# Patient Record
Sex: MALE | Race: WHITE | NOT HISPANIC OR LATINO | Employment: UNEMPLOYED | URBAN - METROPOLITAN AREA
[De-identification: names, ages, dates, MRNs, and addresses within clinical notes are randomized per-mention and may not be internally consistent; named-entity substitution may affect disease eponyms.]

---

## 2019-07-31 PROBLEM — M54.2 NECK PAIN: Status: ACTIVE | Noted: 2019-07-31

## 2019-07-31 PROBLEM — M79.601 BILATERAL ARM PAIN: Status: ACTIVE | Noted: 2019-07-31

## 2019-07-31 PROBLEM — M79.602 BILATERAL ARM PAIN: Status: ACTIVE | Noted: 2019-07-31

## 2020-09-14 ENCOUNTER — HISTORICAL (OUTPATIENT)
Dept: RADIOLOGY | Facility: HOSPITAL | Age: 40
End: 2020-09-14

## 2022-04-11 ENCOUNTER — HISTORICAL (OUTPATIENT)
Dept: ADMINISTRATIVE | Facility: HOSPITAL | Age: 42
End: 2022-04-11
Payer: MEDICAID

## 2022-04-29 VITALS
SYSTOLIC BLOOD PRESSURE: 133 MMHG | DIASTOLIC BLOOD PRESSURE: 89 MMHG | BODY MASS INDEX: 34.57 KG/M2 | WEIGHT: 246.94 LBS | HEIGHT: 71 IN

## 2024-05-23 ENCOUNTER — HOSPITAL ENCOUNTER (OUTPATIENT)
Dept: RADIOLOGY | Facility: HOSPITAL | Age: 44
Discharge: HOME | End: 2024-05-23
Attending: UROLOGY
Payer: MEDICAID

## 2024-05-23 DIAGNOSIS — N20.0 KIDNEY STONE: ICD-10-CM

## 2024-05-23 PROCEDURE — 74018 RADEX ABDOMEN 1 VIEW: CPT | Mod: TC

## 2024-05-23 PROCEDURE — 76770 US EXAM ABDO BACK WALL COMP: CPT | Mod: TC

## 2024-07-18 DIAGNOSIS — E11.65 UNCONTROLLED TYPE 2 DIABETES MELLITUS WITH HYPERGLYCEMIA: Primary | ICD-10-CM

## 2025-01-27 DIAGNOSIS — M79.642 PAIN IN LEFT HAND: Primary | ICD-10-CM

## 2025-01-28 ENCOUNTER — CLINICAL SUPPORT (OUTPATIENT)
Dept: REHABILITATION | Facility: HOSPITAL | Age: 45
End: 2025-01-28
Payer: MEDICAID

## 2025-01-28 DIAGNOSIS — M79.642 LEFT HAND PAIN: Primary | ICD-10-CM

## 2025-01-28 PROCEDURE — 97162 PT EVAL MOD COMPLEX 30 MIN: CPT

## 2025-01-28 PROCEDURE — 97530 THERAPEUTIC ACTIVITIES: CPT

## 2025-01-28 NOTE — PROGRESS NOTES
Outpatient Rehab    Occupational Therapy Evaluation    Patient Name: Luke Crowder Jr.  MRN: [id number]  YOB: 1980  Today's Date: 1/29/2025    Therapy Diagnosis:   Encounter Diagnosis   Name Primary?    Left hand pain Yes     Physician: Dean Kelley MD    Physician Orders: Eval and Treat  Medical Diagnosis: Left hand weakness.    Visit # / Visits Authorized:  16 / TBD   Date of Evaluation:  1/28/2025   Insurance Authorization Period: TBD   Plan of Care Certification:  1/28/2025 to 04/22/25      Time In: 1035   Time Out: 1135  Total Time: 60   Total Billable Time: 60    Precautions  Left Upper Extremity Weight-Bearing Status: Full weight-bearing         Subjective   History of Present Illness  Luke is a 44 y.o. male who reports to occupational therapy with a chief concern of Left hand pain, weakness, and decreased AROM.. According to the patient's chart, Luke has a past medical history of Hypertension and Neuropathy. Luke has a past surgical history that includes Cholecystectomy and Spine surgery.    The patient reports a medical diagnosis of Left hand injury. The patient has experienced this issue since 12/06/24.   Diagnostic tests related to this condition: X-ray.   X-Ray Details: Negative    Dominant Hand: Right  History of Present Condition/Illness: Luke is 43 y/o male with recent history of left hand injury.  Luke reported that on 12/06/24 he was cutting up vegetables using a [place] gardner hydraulic cutter when he accidentally press the engage button with his left hand near the cutting blades resulting in severe lacerations of the distal ends of digits 2-5.  Luke was taken to the ED department at [facility] were he was assessed, lacerations sutured, and he was instructed to return in 2 weeks to have sutures removed.  Luke returned to the hospital and his sutures were removed and he was instructed to follow-up with his PCP.  Luke was seen by his PCP Dr. Moran  Allie on 01/14/25 and orders sent to [facility] out-patient OT to evaluate and treat left hand pain/weakness.    Activities of Daily Living  Social history was obtained from Patient.    General Prior Level of Function Comments: Independent  General Current Level of Function Comments: Modified Independent  Patient Responsibilities: Driving, Home management, Personal ADL    Previously independent with activities of daily living? Yes     Currently independent with activities of daily living? Yes          Previously independent with instrumental activities of daily living? Yes     Currently independent with instrumental activities of daily living? No  Activities currently needing assistance include: Meal prep.            Pain     Patient reports a current pain level of 2/10. Pain at best is reported as 0/10. Pain at worst is reported as 10/10.   Location: Left hand primarily middle and ring finger.  Clinical Progression (since onset): Stable  Pain Qualities: Knife-like, Needle-like, Sharp  Pain-Relieving Factors: Immobilization, Relaxation  Pain-Aggravating Factors: Holding objects, Lifting, Other (Comment)  Other Pain-Aggravating Factors: Digit flexion         Review of Systems  Patient reports: Diabetes, Anxiety, Arthritis, Back Pain, Depression, and Neck Pain        Living Arrangements  Living Situation  Housing: Home independently  Living Arrangements: Alone  Support Systems: Parent    Equipment/Treatments  Patient Expressive Communication Modes: Verbal        Employment  Employment Status: On disability          Past Medical History/Physical Systems Review:   Luke Lakesha Benavides  has a past medical history of Hypertension and Neuropathy.    Luke Lakesha Benavides  has a past surgical history that includes Cholecystectomy and Spine surgery.    Luke has a current medication list which includes the following prescription(s): aspirin, escitalopram oxalate, meloxicam, metoprolol succinate, promethazine, and  tramadol.    Review of patient's allergies indicates:  No Known Allergies     Objective   Communication  The patient's current expressive communication modes are Verbal.                         The patient's behavior and affect appear Appropriate to situation and Pleasant.                      Cognition   Orientation level is Oriented x4.      Observed memory impairments include Decreased short-term memory.   Observed attention impairments include None/intact.                                                 Upper Extremity Sensation  General Upper Extremity Sensation  Impaired: Left       Left Upper Extremity Sensation  Intact: Sharp/Dull Discrimination, Kinesthesia, and Proprioception  Diminished: Light Touch  Left Upper Extremity Sensation Light Touch Comment: Volar aspect distal 2/3rds of digits 3,4,5             Wrist/Hand Palpation          Left Wrist/Hand Palpation  Abnormal: Muscle  Left Wrist Hand Muscle Palpation Observations: Tender with palpation of scar tissue on volar aspect of digits 3,4,5            Digit 2 - Index Finger Active Range of Motion  Left Index Finger   Flexion (deg) Extension (deg) Pain   MCP 70       PIP 80       DIP 60       ABDULLAHI:      Digit 3 - Middle Finger Active Range of Motion  Left Middle Finger   Flexion (deg) Extension (deg) Pain   MCP 75       PIP 85       DIP 37   Yes   ABDULLAHI:      Digit 4 - Ring Finger Active Range of Motion  Left Ring Finger   Flexion (deg) Extension (deg) Pain   MCP 65       PIP 82       DIP 37   Yes   ABDULLAHI:      Digit 5 - Little Finger Active Range of Motion  Left Little Finger   Flexion (deg) Extension (deg) Pain   MCP 65       PIP 75       DIP 37   Yes   ABDULLAHI:        Tested thumb opposition using Kapandji scale, right hand 10/10, left 10/10                 Right  Strength  Right Hand Dynamometer Position: 2  Elbow Position Forearm Position Trial 1 (lbs) Trial 2  (lbs) Trial 3  (lbs) Average  (lbs) Pain   Flexed Neutral 39 25 25 29.67         Left   "Strength  Left Hand Dynamometer Position: 2  Elbow Position Forearm Position Trial 1 (lbs) Trial 2 (lbs) Trial 3 (lbs) Average (lbs) Pain   Flexed Neutral 13 10 5 9.33         Right Pinch Strength   Trial 1 (lbs) Trial 2 (lbs) Trial 3 (lbs) Average (lbs) Pain   Lateral (Key Pinch) 5           Three Point (Three Jaw Ean) 4           Two Point (Tip to Tip) 4               Left Pinch Strength   Trial 1 (lbs) Trial 2 (lbs) Trial 3 (lbs) Average (lbs) Pain   Lateral (Key Pinch) 3           Three Point (Three Jaw Ean) 5           Two Point (Tip to Tip) 2                      Wrist/Hand Special Tests  Hand Coordination Special Tests  Right 9-Hole Peg Test (sec): 24  Left 9-Hole Peg Test (sec): 25       Coordination  Right 9-Hole Peg Test (sec): 24  Left 9-Hole Peg Test (sec): 25               Intake Outcome Measure for FOTO Survey    Therapist reviewed FOTO scores for Luke Crowder Jr. on 1/28/2025.   FOTO report - see Media section or FOTO account episode details.     Intake Score: 31%    Treatment:  Therapeutic Activity  Therapeutic Activity 1: Educated pt on scar massage and left hand AROM home exercise program.    Patient's spiritual, cultural, and educational needs considered and patient agreeable to plan of care and goals.     Assessment & Plan   Assessment  Luke presents with a condition of Moderate complexity.   Presentation of Symptoms: Stable  Will Comorbidities Impact Care: Yes  History of diabetes with neuropathy will impact sensory recovery.    IADL Limitations: Driving, Meal preparation and cleanup  Rest and Sleep Limitations: Disrupted sleep pattern  Functional Limitations: Activity tolerance, Carrying objects, Fine motor coordination, Pain with ADLs/IADLs, Range of motion                    Patient Goal for Therapy (OT): "I want to be able to bend my fingers."  Prognosis: Good  Assessment Details: Luke is 45 y/o male with recent history of left hand injury.  He sustained multiple lacerations to the " distal 2/3rds of the volar aspect of digits 2-5.  All wounds have completely healed, but dense scar tissue has formed primarily in digits 3,4, and 5.  Luke demonstrates decreased AROM, strength, fine motor coordination and discomfort primarily in the DIP joints of digits 3,4, and 5 effecting left hand function when performing IADL activities.  He will benefit from OT services to address deficits with goal of performing prior IADL activities with minimal difficulty.       Plan  From an occupational therapy perspective, the patient would benefit from: Skilled Rehab Services    Planned therapy interventions include: Therapeutic exercise, Therapeutic activities, and Manual therapy.    Planned modalities to include: Paraffin bath and Thermotherapy (hot pack).        Visit Frequency: 2 times Per Week for 8 Weeks.       This plan was discussed with Patient.   Discussion participants: Agreed Upon Plan of Care             Goals:   Active       Complete Long Term Goals in 8 weeks       Pt will increase left hand AROM and strength to be able to carry a shopping bag with minimal difficulty.       Start:  01/29/25    Expected End:  03/26/25            Pt will increase left hand FMC to perform dressing activity with minimal difficulty.       Start:  01/29/25    Expected End:  03/26/25            Pt will report and increase on the FOTO outcome measure to 52 or better.       Start:  01/29/25    Expected End:  03/26/25               Complete Short Term Goals in 4 weeks       Pt will increase left hand  strength 5# and pinch strength 2 # to be able to carry a shopping bag with moderate difficulty.       Start:  01/29/25    Expected End:  02/26/25            Pt will increase left hand AROM to be able to turn a key with moderate difficulty.       Start:  01/29/25    Expected End:  02/26/25            Pt will be educated on a home exercise program and perform independently.       Start:  01/29/25    Expected End:  02/26/25             Pt will report and increase on the FOTO outcome measure to 40 or better.       Start:  01/29/25    Expected End:  02/26/25

## 2025-01-28 NOTE — LETTER
January 29, 2025  Dean Kelley MD  [address] [place], [place] [address] [place] [address]    Dear Luke Crowder JrJuan Alberto,    The attached plan of care is being sent to you for review and reference.    You may indicate your approval by signing the document electronically, or by faxing/mailing a signed copy of the final page of this document back to the attention of Siddhartha Mercado OT:         Plan of Care 1/28/25   Effective from: 1/28/2025  Effective to: 4/22/2025    Plan ID: [id number]            Participants as of Finalize on 1/29/2025    Name Type Comments Contact Info    Dean Kelley MD PCP - General  [phone]    Siddhartha Mercado OT Occupational Therapist         Last Plan Note     Author: Siddhartha Mercado OT Status: Incomplete Last edited: 1/28/2025 12:00 PM         Outpatient Rehab    Occupational Therapy Evaluation    Patient Name: Luke Crowder Jr.  MRN: [id number]  YOB: 1980  Today's Date: 1/29/2025    Therapy Diagnosis:   Encounter Diagnosis   Name Primary?    Left hand pain Yes     Physician: Dean Kelley MD    Physician Orders: Eval and Treat  Medical Diagnosis: Left hand weakness.    Visit # / Visits Authorized:  16 / TBD   Date of Evaluation:  1/28/2025   Insurance Authorization Period: TBD   Plan of Care Certification:  1/28/2025 to 04/22/25      Time In: 1035   Time Out: 1135  Total Time: 60   Total Billable Time: 60    Precautions  Left Upper Extremity Weight-Bearing Status: Full weight-bearing         Subjective  History of Present Illness  Luke is a 44 y.o. male who reports to occupational therapy with a chief concern of Left hand pain, weakness, and decreased AROM.. According to the patient's chart, Luke has a past medical history of Hypertension and Neuropathy. Luke has a past surgical history that includes Cholecystectomy and Spine surgery.    The patient reports a medical diagnosis of Left hand injury. The patient has experienced  this issue since 12/06/24.   Diagnostic tests related to this condition: X-ray.   X-Ray Details: Negative    Dominant Hand: Right  History of Present Condition/Illness: Luke is 43 y/o male with recent history of left hand injury.  Luke reported that on 12/06/24 he was cutting up vegetables using a [place] gardner hydraulic cutter when he accidentally press the engage button with his left hand near the cutting blades resulting in severe lacerations of the distal ends of digits 2-5.  Luke was taken to the ED department at [facility] were he was assessed, lacerations sutured, and he was instructed to return in 2 weeks to have sutures removed.  Luke returned to the hospital and his sutures were removed and he was instructed to follow-up with his PCP.  Luek was seen by his PCP Dr. Dean Kelley on 01/14/25 and orders sent to [facility] out-patient OT to evaluate and treat left hand pain/weakness.    Activities of Daily Living  Social history was obtained from Patient.    General Prior Level of Function Comments: Independent  General Current Level of Function Comments: Modified Independent  Patient Responsibilities: Driving, Home management, Personal ADL    Previously independent with activities of daily living? Yes     Currently independent with activities of daily living? Yes          Previously independent with instrumental activities of daily living? Yes     Currently independent with instrumental activities of daily living? No  Activities currently needing assistance include: Meal prep.            Pain     Patient reports a current pain level of 2/10. Pain at best is reported as 0/10. Pain at worst is reported as 10/10.   Location: Left hand primarily middle and ring finger.  Clinical Progression (since onset): Stable  Pain Qualities: Knife-like, Needle-like, Sharp  Pain-Relieving Factors: Immobilization, Relaxation  Pain-Aggravating Factors: Holding objects, Lifting, Other (Comment)  Other  Pain-Aggravating Factors: Digit flexion         Review of Systems  Patient reports: Diabetes, Anxiety, Arthritis, Back Pain, Depression, and Neck Pain        Living Arrangements  Living Situation  Housing: Home independently  Living Arrangements: Alone  Support Systems: Parent    Equipment/Treatments  Patient Expressive Communication Modes: Verbal        Employment  Employment Status: On disability          Past Medical History/Physical Systems Review:   Luke Crowder Jr.  has a past medical history of Hypertension and Neuropathy.    Luke Crowder Jr.  has a past surgical history that includes Cholecystectomy and Spine surgery.    Luke has a current medication list which includes the following prescription(s): aspirin, escitalopram oxalate, meloxicam, metoprolol succinate, promethazine, and tramadol.    Review of patient's allergies indicates:  No Known Allergies     Objective  Communication  The patient's current expressive communication modes are Verbal.                         The patient's behavior and affect appear Appropriate to situation and Pleasant.                      Cognition   Orientation level is Oriented x4.      Observed memory impairments include Decreased short-term memory.   Observed attention impairments include None/intact.                                                 Upper Extremity Sensation  General Upper Extremity Sensation  Impaired: Left       Left Upper Extremity Sensation  Intact: Sharp/Dull Discrimination, Kinesthesia, and Proprioception  Diminished: Light Touch  Left Upper Extremity Sensation Light Touch Comment: Volar aspect distal 2/3rds of digits 3,4,5             Wrist/Hand Palpation          Left Wrist/Hand Palpation  Abnormal: Muscle  Left Wrist Hand Muscle Palpation Observations: Tender with palpation of scar tissue on volar aspect of digits 3,4,5            Digit 2 - Index Finger Active Range of Motion  Left Index Finger   Flexion (deg) Extension (deg) Pain   MCP 70        PIP 80       DIP 60       ABDULLAHI:      Digit 3 - Middle Finger Active Range of Motion  Left Middle Finger   Flexion (deg) Extension (deg) Pain   MCP 75       PIP 85       DIP 37   Yes   ABDULLAHI:      Digit 4 - Ring Finger Active Range of Motion  Left Ring Finger   Flexion (deg) Extension (deg) Pain   MCP 65       PIP 82       DIP 37   Yes   ABDULLAHI:      Digit 5 - Little Finger Active Range of Motion  Left Little Finger   Flexion (deg) Extension (deg) Pain   MCP 65       PIP 75       DIP 37   Yes   ABDULLAHI:        Tested thumb opposition using Kapandji scale, right hand 10/10, left 10/10                 Right  Strength  Right Hand Dynamometer Position: 2  Elbow Position Forearm Position Trial 1 (lbs) Trial 2  (lbs) Trial 3  (lbs) Average  (lbs) Pain   Flexed Neutral 39 25 25 29.67         Left  Strength  Left Hand Dynamometer Position: 2  Elbow Position Forearm Position Trial 1 (lbs) Trial 2 (lbs) Trial 3 (lbs) Average (lbs) Pain   Flexed Neutral 13 10 5 9.33         Right Pinch Strength   Trial 1 (lbs) Trial 2 (lbs) Trial 3 (lbs) Average (lbs) Pain   Lateral (Key Pinch) 5           Three Point (Three Jaw Ean) 4           Two Point (Tip to Tip) 4               Left Pinch Strength   Trial 1 (lbs) Trial 2 (lbs) Trial 3 (lbs) Average (lbs) Pain   Lateral (Key Pinch) 3           Three Point (Three Jaw Ean) 5           Two Point (Tip to Tip) 2                      Wrist/Hand Special Tests  Hand Coordination Special Tests  Right 9-Hole Peg Test (sec): 24  Left 9-Hole Peg Test (sec): 25       Coordination  Right 9-Hole Peg Test (sec): 24  Left 9-Hole Peg Test (sec): 25               Intake Outcome Measure for FOTO Survey    Therapist reviewed FOTO scores for Luke Wynngeno Benavides on 1/28/2025.   FOTO report - see Media section or FOTO account episode details.     Intake Score: 31%    Treatment:  Therapeutic Activity  Therapeutic Activity 1: Educated pt on scar massage and left hand AROM home exercise program.    Patient's  "spiritual, cultural, and educational needs considered and patient agreeable to plan of care and goals.     Assessment & Plan  Assessment  Luke presents with a condition of Moderate complexity.   Presentation of Symptoms: Stable  Will Comorbidities Impact Care: Yes  History of diabetes with neuropathy will impact sensory recovery.    IADL Limitations: Driving, Meal preparation and cleanup  Rest and Sleep Limitations: Disrupted sleep pattern  Functional Limitations: Activity tolerance, Carrying objects, Fine motor coordination, Pain with ADLs/IADLs, Range of motion                    Patient Goal for Therapy (OT): "I want to be able to bend my fingers."  Prognosis: Good  Assessment Details: Luke is 43 y/o male with recent history of left hand injury.  He sustained multiple lacerations to the distal 2/3rds of the volar aspect of digits 2-5.  All wounds have completely healed, but dense scar tissue has formed primarily in digits 3,4, and 5.  Luke demonstrates decreased AROM, strength, fine motor coordination and discomfort primarily in the DIP joints of digits 3,4, and 5 effecting left hand function when performing IADL activities.  He will benefit from OT services to address deficits with goal of performing prior IADL activities with minimal difficulty.       Plan  From an occupational therapy perspective, the patient would benefit from: Skilled Rehab Services    Planned therapy interventions include: Therapeutic exercise, Therapeutic activities, and Manual therapy.    Planned modalities to include: Paraffin bath and Thermotherapy (hot pack).        Visit Frequency: 2 times Per Week for 8 Weeks.       This plan was discussed with Patient.   Discussion participants: Agreed Upon Plan of Care             Goals:   Active       Complete Long Term Goals in 8 weeks       Pt will increase left hand AROM and strength to be able to carry a shopping bag with minimal difficulty.       Start:  01/29/25    Expected End:  03/26/25 "            Pt will increase left hand FMC to perform dressing activity with minimal difficulty.       Start:  01/29/25    Expected End:  03/26/25            Pt will report and increase on the FOTO outcome measure to 52 or better.       Start:  01/29/25    Expected End:  03/26/25               Complete Short Term Goals in 4 weeks       Pt will increase left hand  strength 5# and pinch strength 2 # to be able to carry a shopping bag with moderate difficulty.       Start:  01/29/25    Expected End:  02/26/25            Pt will increase left hand AROM to be able to turn a key with moderate difficulty.       Start:  01/29/25    Expected End:  02/26/25            Pt will be educated on a home exercise program and perform independently.       Start:  01/29/25    Expected End:  02/26/25            Pt will report and increase on the FOTO outcome measure to 40 or better.       Start:  01/29/25    Expected End:  02/26/25                     Current Participants as of 1/29/2025    Name Type Comments Contact Info    Dean Kelley MD PCP - General  [phone]    Signature pending    Siddhartha Mercado OT Occupational Therapist      Signature pending            Sincerely,      Siddhartha Mercado OT  [facility]                                                            Dear Siddhartha Mercado OT,    RE: Mr. Luke Crowder, MRN: [id number]    I certify that I have reviewed the attached plan of care and agree to the details within.        ___________________________  ___________________________  Patient Printed Name    Patient Signed Name      ___________________________  Date and Time

## 2025-01-30 ENCOUNTER — CLINICAL SUPPORT (OUTPATIENT)
Dept: REHABILITATION | Facility: HOSPITAL | Age: 45
End: 2025-01-30
Payer: MEDICAID

## 2025-01-30 DIAGNOSIS — M79.642 LEFT HAND PAIN: Primary | ICD-10-CM

## 2025-01-30 PROCEDURE — 97530 THERAPEUTIC ACTIVITIES: CPT

## 2025-01-30 NOTE — PROGRESS NOTES
"  Outpatient Rehab    Occupational Therapy Visit    Patient Name: Luke Crowder Jr.  MRN: [id number]  YOB: 1980  Today's Date: 1/30/2025    Therapy Diagnosis:   Encounter Diagnosis   Name Primary?    Left hand pain Yes     Physician: Melchor Galvan,*    Physician Orders: Eval and Treat  Medical Diagnosis: Left hand weakness.     Visit # / Visits Authorized:  16 / TBD   Date of Evaluation:  1/28/2025   Insurance Authorization Period: TBD   Plan of Care Certification:  1/28/2025 to 04/22/25      Time In: 1129   Time Out: 1219  Total Time: 50   Total Billable Time: 40         Subjective   Pt reported left hand fingers feel " a little looser", but are sore from the exercises.  Initially he reported his left hand pain 5/10 that decreased to 4/10 after intervention..  Pain reported as 5.      Objective           Intake Outcome Measure for FOTO Survey    Therapist reviewed FOTO scores for Luke Crowder Jr. on 1/30/2025.   FOTO report - see Media section or FOTO account episode details.     Intake Score:  31%  Survey Score 1:  %  Survey Score 2:  %    Treatment:  Therapeutic Activity  Therapeutic Activity 1: Scar massage performed with mini massager volar aspect digits 3,4,5 followed by scar mobilization.  Therapeutic Activity 2: Performed joint mobilization with end range stretching to tolerance left hand digits 3,4,5  Therapeutic Activity 3: Bilateral hand strengtheing ex's with soft resistance, yellow, theraputty.  Performed gross grasp, palmar rolls, tip to tip pinch pattern, and lateral pinch/pulls 10 reps.    Modalities  Cryotherapy (Minutes\Location): Cold pack applied to left hand digits 5 mins to decreased edema and soreness from increased activity.  Paraffin Bath: Applied to left hand 5 mins to increase circulation to promote healing.     Patient's spiritual, cultural, and educational needs considered and patient agreeable to plan of care and goals.     Assessment & Plan   Assessment: Pt " seen in clinic setting.  Pt 30 minutes early for session and well groomed.  Pt reported performing his HEP daily as instructed.  Noted scar tissue is softening and beginning to mature.  Noted increase in left hand PIP flexion in digits 3,4,5 when performing theraputty ex's.  Began bilateral hand strengtheing ex's and will progress as tolerated.  Recommend continuing present line of to address left hand deficits with goal of performing IADL activities at prior level with minimal difficulty.  Evaluation/Treatment Tolerance: Patient tolerated treatment well  Education  Education was done with Patient. The patient's learning style includes Demonstration. The patient Demonstrates understanding and Verbalizes understanding.         Issued pt soft resistance, yellow, theraputty and educated on a home exercise program (HEP).  Reviewed scar massage and digit joint isolation AROM ex's to be performed daily.       Plan: OT to focus next session on pain/scar management, activites to increase left hand AROM/strength/FMC, and review/modification of HEP.    Goals:   Active       Complete Long Term Goals in 8 weeks       Pt will increase left hand AROM and strength to be able to carry a shopping bag with minimal difficulty. (Progressing)       Start:  01/29/25    Expected End:  03/26/25            Pt will increase left hand FMC to perform dressing activity with minimal difficulty. (Progressing)       Start:  01/29/25    Expected End:  03/26/25            Pt will report and increase on the FOTO outcome measure to 52 or better. (Progressing)       Start:  01/29/25    Expected End:  03/26/25               Complete Short Term Goals in 4 weeks       Pt will increase left hand  strength 5# and pinch strength 2 # to be able to carry a shopping bag with moderate difficulty. (Progressing)       Start:  01/29/25    Expected End:  02/26/25            Pt will increase left hand AROM to be able to turn a key with moderate difficulty.  (Progressing)       Start:  01/29/25    Expected End:  02/26/25            Pt will be educated on a home exercise program and perform independently. (Progressing)       Start:  01/29/25    Expected End:  02/26/25            Pt will report and increase on the FOTO outcome measure to 40 or better. (Progressing)       Start:  01/29/25    Expected End:  02/26/25

## 2025-02-04 ENCOUNTER — CLINICAL SUPPORT (OUTPATIENT)
Dept: REHABILITATION | Facility: HOSPITAL | Age: 45
End: 2025-02-04
Payer: MEDICAID

## 2025-02-04 DIAGNOSIS — M79.642 LEFT HAND PAIN: Primary | ICD-10-CM

## 2025-02-04 PROCEDURE — 97530 THERAPEUTIC ACTIVITIES: CPT

## 2025-02-04 NOTE — PROGRESS NOTES
Outpatient Rehab    Occupational Therapy Visit    Patient Name: Luke Crowder Jr.  MRN: [id number]  YOB: 1980  Today's Date: 2/4/2025    Therapy Diagnosis:   Encounter Diagnosis   Name Primary?    Left hand pain Yes     Physician: Melchor Galvan,*    Physician Orders: Eval and Treat  Visit #: 3     Time In: 1105    Time Out:  1155  Total Time:   50  Total Billable Time: 40         Subjective   Luke reported performing his HEP daily as prescribed.  He reported increased left hand functiona and is performing most IADL activities with minimal discomfort..  Family / care giver present for this visit:   Pain reported as 1/10. Luke reported an increase in left hand discomfort to 4/10 during ex's.    Objective           Intake Outcome Measure for FOTO Survey    Therapist reviewed FOTO scores for Luke Crowder Jr. on 2/4/2025.   FOTO report - see Media section or FOTO account episode details.     Intake Score:  31%  Survey Score 1:  %  Survey Score 2:  %    Treatment:  Therapeutic Exercise  Therapeutic Exercise Activity 1: Left hand strength/FMC activity.  With left hand removed 10 various size screws from bolt-board, bilaterally removed screws from medium/soft theraputty using lateral, tripod, and tip to tip pinch patterns, then reapplied to proper bolt with left hand.  Therapeutic Exercise Activity 2: Bilateral hand strengtheing gross grasp with gripper set at 25 # resistance alternating left/right hand 3x10 reps.    Therapeutic Activity  Therapeutic Activity 1: Perfomed 3 min scar massage to volar aspect of left middle finger then 2 min scar massage to 4-5th digits.  Therapeutic Activity 2: Performed joint mobilization with end range stretching all joints all planes left hand digits 3,4,5    Modalities  Cryotherapy (Minutes\Location): End of session, cold pack applied to left hand 5 mins to decrease edema and soreness from increased activity.  Paraffin Bath: Began session, paraffin applied to  left hand 5 mins to increase circulation and promote healing.     Patient's spiritual, cultural, and educational needs considered and patient agreeable to plan of care and goals.     Assessment & Plan   Assessment: Pt seen in clinic setting.  Pt 1 hour earliy for session and well groomed.  Noted left hand scars are maturing well and are more pliable with increased flexability during digit ROM.  Left hand strength continues to be decreased effecting hand function and Luke contiues to report discomfort with gross grasp.  Recommend cont PLC to address left hand deficits with goal of performing IADFL activities with minimal difficulty.  Evaluation/Treatment Tolerance: Patient tolerated treatment well        Plan: OT to focus next session on pain/scar management, increasing left hand AROM/strength/FMC, and review/modification of HEP.    Goals:   Active       Complete Long Term Goals in 8 weeks       Pt will increase left hand AROM and strength to be able to carry a shopping bag with minimal difficulty. (Progressing)       Start:  01/29/25    Expected End:  03/26/25            Pt will increase left hand FMC to perform dressing activity with minimal difficulty. (Progressing)       Start:  01/29/25    Expected End:  03/26/25            Pt will report and increase on the FOTO outcome measure to 52 or better. (Progressing)       Start:  01/29/25    Expected End:  03/26/25               Complete Short Term Goals in 4 weeks       Pt will increase left hand  strength 5# and pinch strength 2 # to be able to carry a shopping bag with moderate difficulty. (Progressing)       Start:  01/29/25    Expected End:  02/26/25            Pt will increase left hand AROM to be able to turn a key with moderate difficulty. (Progressing)       Start:  01/29/25    Expected End:  02/26/25            Pt will be educated on a home exercise program and perform independently. (Met)       Start:  01/29/25    Expected End:  02/26/25    Resolved:   02/04/25         Pt will report and increase on the FOTO outcome measure to 40 or better. (Progressing)       Start:  01/29/25    Expected End:  02/26/25                Siddhartha Mercado OT

## 2025-02-07 ENCOUNTER — CLINICAL SUPPORT (OUTPATIENT)
Dept: REHABILITATION | Facility: HOSPITAL | Age: 45
End: 2025-02-07
Payer: MEDICAID

## 2025-02-07 DIAGNOSIS — M79.642 LEFT HAND PAIN: Primary | ICD-10-CM

## 2025-02-07 PROCEDURE — 97530 THERAPEUTIC ACTIVITIES: CPT

## 2025-02-07 NOTE — PROGRESS NOTES
Outpatient Rehab    Occupational Therapy Visit    Patient Name: Luke Crowder Jr.  MRN: [id number]  YOB: 1980  Today's Date: 2/7/2025    Therapy Diagnosis:   Encounter Diagnosis   Name Primary?    Left hand pain Yes     Physician: Melchor Galvan,*    Physician Orders: Eval and Treat  Visit # 4   Time In: 1125   Time Out: 1210  Total Time: 45   Total Billable Time: 35         Subjective   Pt reported using his left hand more, but continues to have difficulty gripping objects..  Pain reported as 4/10. Pt reporting burning sore sensation middle phalanx left middle finger.    Objective           Intake Outcome Measure for FOTO Survey    Therapist reviewed FOTO scores for Luke Crowder Jr. on 2/7/2025.   FOTO report - see Media section or FOTO account episode details.     Intake Score: 31%  Survey Score 1:  %  Survey Score 2:  %    Treatment:  Therapeutic Exercise  Therapeutic Exercise Activity 1: Seated left forearm strengthening with 2# dumbbell over towel roll, 2x10 reps wrist ext then pron/sup  Therapeutic Exercise Activity 2: Left hand strengthening ex with gripper set at 25 # resistance 3x10 reps gross grasp.  Therapeutic Exercise Activity 3: Left hand strengtheing/FMC, reaching and placing graded clothes pins 5 sets of 5 pins 2-16# resistance 2x's with tripod then lateral pinch pattern  Therapeutic Exercise Activity 4: Left hand strength/FMC activitiy crumpling 5 sheets of copy paper into small ball focusing on finger isolation.    Manual Therapy  Manual Therapy Activity 1: Performed 3 min scar massage to digits 3,4,5 of left hand followed by joint mobilization, all joints all planes, with end range stretching to  tolerance.    Modalities  Cryotherapy (Minutes\Location): End of session, cold pack applied to left hand 5 mins to decrease edema and prevent soreness from increase activity.  Paraffin Bath: Begining of session paraffin applied to left hand 5 mins to increase circulation to  promote healing.     Patient's spiritual, cultural, and educational needs considered and patient agreeable to plan of care and goals.     Assessment & Plan   Assessment: Pt seen in clinic setting.  Pt 1 hour early and well groomed.  Noted increase flexibility and AROM in left hand PIP/DIP joints of digits 3,4,5.  Noted increasing left hand strength/FMC.  Pt continues to report difficulty gripping when performing IADL activities and intermitten edema in middle digit.  Recommend continuing PLC to address left hand deficits with goal of performing prior IADL activities with minimal difficulty.  Evaluation/Treatment Tolerance: Patient tolerated treatment well  Education  Education was done with Patient. The patient's learning style includes Listening. The patient Verbalizes understanding.         Reinforced HEP.       Plan: OT to focus next session on pain/scar management, increasing left hand AROM/strength/FMC, and review/modification of HEP.    Goals:   Active       Complete Long Term Goals in 8 weeks       Pt will increase left hand AROM and strength to be able to carry a shopping bag with minimal difficulty. (Progressing)       Start:  01/29/25    Expected End:  03/26/25            Pt will increase left hand FMC to perform dressing activity with minimal difficulty. (Progressing)       Start:  01/29/25    Expected End:  03/26/25            Pt will report and increase on the FOTO outcome measure to 52 or better. (Progressing)       Start:  01/29/25    Expected End:  03/26/25               Complete Short Term Goals in 4 weeks       Pt will increase left hand  strength 5# and pinch strength 2 # to be able to carry a shopping bag with moderate difficulty. (Progressing)       Start:  01/29/25    Expected End:  02/26/25            Pt will increase left hand AROM to be able to turn a key with moderate difficulty. (Progressing)       Start:  01/29/25    Expected End:  02/26/25            Pt will be educated on a home  exercise program and perform independently. (Met)       Start:  01/29/25    Expected End:  02/26/25    Resolved:  02/04/25         Pt will report and increase on the FOTO outcome measure to 40 or better. (Progressing)       Start:  01/29/25    Expected End:  02/26/25                Siddhartha Mercado, OT

## 2025-02-11 ENCOUNTER — CLINICAL SUPPORT (OUTPATIENT)
Dept: REHABILITATION | Facility: HOSPITAL | Age: 45
End: 2025-02-11
Payer: MEDICAID

## 2025-02-11 DIAGNOSIS — M79.642 LEFT HAND PAIN: Primary | ICD-10-CM

## 2025-02-11 PROCEDURE — 97530 THERAPEUTIC ACTIVITIES: CPT

## 2025-02-11 NOTE — PROGRESS NOTES
Outpatient Rehab    Occupational Therapy Visit    Patient Name: Luke Crowder Jr.  MRN: [id number]  YOB: 1980  Today's Date: 2/11/2025    Therapy Diagnosis:   Encounter Diagnosis   Name Primary?    Left hand pain Yes     Physician: Dean Kelley MD    Physician Orders: Eval and Treat    Visit # 4   Time In: 1105   Time Out: 1155  Total Time: 50   Total Billable Time: 40    FOTO:  Intake Score: 43%  Survey Score 1:  %  Survey Score 2:  %         Subjective   Pt does not report discomfort at rest.  Does continue to report discomfort with palpation of the left hand middle digit DIP joint.  He contiues to report increased left hand digit AROM..  Pain reported as 0/10.      Objective           Treatment:  Therapeutic Exercise  Therapeutic Exercise Activity 1: Seated left forearm strengthening with 2# dumbbell over towel roll, 3x10 reps wrist ext then pron/sup  Therapeutic Exercise Activity 2: Bilateral hand strengthening ex with gripper set at 35 # resistance 3x10 reps gross grasp alternating l/r.  Therapeutic Exercise Activity 3: Left hand strengtheing/FMC, reaching and placing graded clothes pins 5 sets of 5 pins 2-16# resistance 2x's with tripod then lateral pinch pattern  Therapeutic Exercise Activity 4: Bilateral hand/forearm strengtheing ex with dowels on velcro board, performed lateral pinch with sup/pron followed by wrist flex/ext 1x10 reps alternating l/r    Manual Therapy  Manual Therapy Activity 1: Performed 3 min scar massage to digits 3,4,5 of left hand followed by joint mobilization, all joints all planes, with end range stretching to  tolerance.    Modalities  Cryotherapy (Minutes\Location): End of session, cold pack applied to left hand 5 mins to decrease edema and prevent soreness from increase activity.  Paraffin Bath: Begining of session paraffin applied to left hand 5 mins to increase circulation to promote healing.     Assessment & Plan   Assessment: Pt seen in clinic setting.   Pt 1 hour early and well groomed.  Noted pt has increase left hand digit AROM to WFL as compared to right hand.  Left hand scars cont to mature, but contiues to have moderately dense tissue at the volar aspect of the distal middle digit over DIP joint.  Hand strength/FMC cont to improve but noted decrease endurance with activity.  Recommend cont PLC to address left hand deficits with goal of performing prior IADL activities with minimal difficulty.  Evaluation/Treatment Tolerance: Patient tolerated treatment well    Patient will continue to benefit from skilled outpatient occupational therapy to address the deficits listed in the problem list box on initial evaluation, provide pt/family education and to maximize pt's level of independence in the home and community environment.     Patient's spiritual, cultural, and educational needs considered and patient agreeable to plan of care and goals.           Plan: OT to focus next session on pain/scar management, left hand AROM/strength/FMC activities, review/modification of HEP.    Goals:   Active       Complete Long Term Goals in 8 weeks       Pt will increase left hand AROM and strength to be able to carry a shopping bag with minimal difficulty. (Progressing)       Start:  01/29/25    Expected End:  03/26/25            Pt will increase left hand FMC to perform dressing activity with minimal difficulty. (Progressing)       Start:  01/29/25    Expected End:  03/26/25            Pt will report and increase on the FOTO outcome measure to 52 or better. (Progressing)       Start:  01/29/25    Expected End:  03/26/25               Complete Short Term Goals in 4 weeks       Pt will increase left hand  strength 5# and pinch strength 2 # to be able to carry a shopping bag with moderate difficulty. (Progressing)       Start:  01/29/25    Expected End:  02/26/25            Pt will increase left hand AROM to be able to turn a key with moderate difficulty. (Progressing)        Start:  01/29/25    Expected End:  02/26/25            Pt will be educated on a home exercise program and perform independently. (Met)       Start:  01/29/25    Expected End:  02/26/25    Resolved:  02/04/25         Pt will report and increase on the FOTO outcome measure to 40 or better. (Progressing)       Start:  01/29/25    Expected End:  02/26/25                Siddhartha Mercado, OT

## 2025-02-13 ENCOUNTER — CLINICAL SUPPORT (OUTPATIENT)
Dept: REHABILITATION | Facility: HOSPITAL | Age: 45
End: 2025-02-13
Payer: MEDICAID

## 2025-02-13 DIAGNOSIS — M79.642 LEFT HAND PAIN: Primary | ICD-10-CM

## 2025-02-13 PROCEDURE — 97530 THERAPEUTIC ACTIVITIES: CPT

## 2025-02-14 NOTE — PROGRESS NOTES
Outpatient Rehab    Occupational Therapy Visit    Patient Name: Luke Crowder Jr.  MRN: [id number]  YOB: 1980  Today's Date: 2/14/2025    Therapy Diagnosis:   Encounter Diagnosis   Name Primary?    Left hand pain Yes     Physician: Dean Kelley MD    Physician Orders: Eval and Treat  Visit # 5   Time In: 1035   Time Out: 1125  Total Time: 50   Total Billable Time: 40    FOTO:  Intake Score: 43%  Survey Score 1:  %  Survey Score 2:  %         Subjective   Luke reported waking up Wenesday night with his left hand middle digit throbbing..  Pain reported as 4/10.      Objective           Treatment:  Therapeutic Exercise  Therapeutic Exercise Activity 1: Seated left forearm strengthening with 2# dumbbell over towel roll, 3x10 reps wrist ext  Therapeutic Exercise Activity 2: Bilateral hand strengthening ex with gripper set at 35 # resistance 3x10 reps gross grasp alternating l/r.  Therapeutic Exercise Activity 3: Bilateral hand/forearm strengthening ex with 20# flex bar, 1x10 reps bilateral supination then pronation.    Therapeutic Activity  Therapeutic Activity 1: Perfomed 3 min scar massage to volar aspect of left middle finger then 2 min scar massage to 4-5th digits.  Therapeutic Activity 2: Performed joint mobilization with end range stretching all joints all planes left hand digits 3,4,5  Therapeutic Activity 3: Left hand FMC activity with various size coins.  Pt retrieved 5 sets of 10 coins into palm, then transitioned coin to tip to tip pinch pattern to place into container.         Modalities  Cryotherapy (Minutes\Location): End of session, cold pack applied to left hand 5 mins to decrease edema and prevent soreness from increase activity.  Paraffin Bath: Begining of session paraffin applied to left hand 5 mins to increase circulation to promote healing.     Assessment & Plan   Assessment: Pt seen in clinic setting.  Pt 1 hour early and well groomed.  Left hand scars cont to mature, but  Luke is reporting nocturnal discomfort possibly due to night-time edema.  Edcuated him on gentle compression wrapping with [place] to  assist edema management.  Hand strength/FMC cont to improve but noted decrease endurance with activity.  Recommend cont PLC to address left hand deficits with goal of performing prior IADL activities with minimal difficulty.  Evaluation/Treatment Tolerance: Patient tolerated treatment well    Patient will continue to benefit from skilled outpatient occupational therapy to address the deficits listed in the problem list box on initial evaluation, provide pt/family education and to maximize pt's level of independence in the home and community environment.     Patient's spiritual, cultural, and educational needs considered and patient agreeable to plan of care and goals.     Education  Education was done with Patient. The patient's learning style includes Demonstration. The patient Demonstrates understanding and Verbalizes understanding.         Educated pt on gentle compression wrapping left hand middle and ring finger with [place].  Pt educated/demonstrated on the importance of checking capillary refill, pt demonstrated understanding.       Plan: OT to focus next session on pain/scar management, left hand AROM/strength/FMC activities, formal reassessment, review/modification of HEP.    Goals:   Active       Complete Long Term Goals in 8 weeks       Pt will increase left hand AROM and strength to be able to carry a shopping bag with minimal difficulty. (Progressing)       Start:  01/29/25    Expected End:  03/26/25            Pt will increase left hand FMC to perform dressing activity with minimal difficulty. (Progressing)       Start:  01/29/25    Expected End:  03/26/25            Pt will report and increase on the FOTO outcome measure to 52 or better. (Progressing)       Start:  01/29/25    Expected End:  03/26/25               Complete Short Term Goals in 4 weeks       Pt will  increase left hand  strength 5# and pinch strength 2 # to be able to carry a shopping bag with moderate difficulty. (Progressing)       Start:  01/29/25    Expected End:  02/26/25            Pt will increase left hand AROM to be able to turn a key with moderate difficulty. (Progressing)       Start:  01/29/25    Expected End:  02/26/25            Pt will be educated on a home exercise program and perform independently. (Met)       Start:  01/29/25    Expected End:  02/26/25    Resolved:  02/04/25         Pt will report and increase on the FOTO outcome measure to 40 or better. (Progressing)       Start:  01/29/25    Expected End:  02/26/25                Siddhartha Mercado, OT

## 2025-02-18 ENCOUNTER — CLINICAL SUPPORT (OUTPATIENT)
Dept: REHABILITATION | Facility: HOSPITAL | Age: 45
End: 2025-02-18
Payer: MEDICAID

## 2025-02-18 DIAGNOSIS — M79.642 LEFT HAND PAIN: Primary | ICD-10-CM

## 2025-02-18 PROCEDURE — 97530 THERAPEUTIC ACTIVITIES: CPT

## 2025-02-19 NOTE — PROGRESS NOTES
Outpatient Rehab    Occupational Therapy Visit    Patient Name: Luke Crowder Jr.  MRN: [id number]  YOB: 1980  Today's Date: 2/19/2025    Therapy Diagnosis:   Encounter Diagnosis   Name Primary?    Left hand pain Yes     Physician: Dean Kelley MD    Physician Orders: Ev al and Treat    Visit # 6   Time In: 1155   Time Out: 1255  Total Time: 60   Total Billable Time: 50    FOTO:  Intake Score: 43%  Survey Score 1: 50%  Survey Score 2:  %         Subjective   Luke reported nocturnal compression wrapping of left middle and ring finger has helped with night time discomfort.  He did report an increase in left middle digit pain today secondary to cold weather..  Pain reported as 5/10. Luke reported a decrease in left hand pain to 4/10 after therapeutic intervention.    Objective      Digit 2 - Index Finger Active Range of Motion  Left Index Finger   Extension (deg) Flexion (deg) Pain   MCP   80     PIP   100     DIP   65     ABDULLAHI:      Digit 3 - Middle Finger Active Range of Motion  Left Middle Finger   Extension (deg) Flexion (deg) Pain   MCP   83     PIP   100     DIP   57     ABDULLAHI:      Digit 4 - Ring Finger Active Range of Motion  Left Ring Finger   Extension (deg) Flexion (deg) Pain   MCP   75     PIP   98     DIP   46     ABDULLAHI:      Digit 5 - Little Finger Active Range of Motion  Left Little Finger   Extension (deg) Flexion (deg) Pain   MCP   75     PIP   98     DIP   46     ABDULLAHI:                          Left  Strength  Left Hand Dynamometer Position: 2  Elbow Position Forearm Position Trial 1 (lbs) Trial 2 (lbs) Trial 3 (lbs) Average (lbs) Pain   Flexed Neutral 33 25 25 27.67         Left Pinch Strength   Trial 1 (lbs) Trial 2 (lbs) Trial 3 (lbs) Average (lbs) Pain   Lateral (Key Pinch) 14           Three Point (Three Jaw Ean) 5           Two Point (Tip to Tip) 5                      Coordination  Right Grooved Peg Test: 22  Left Grooved Peg Test: 27                 Treatment:  Therapeutic Exercise  Therapeutic Exercise Activity 1: Seated left forearm strengthening with 2# dumbbell over towel roll, 3x10 reps wrist ext  Therapeutic Exercise Activity 2: Bilateral hand strengthening ex with gripper set at 35 # resistance 3x10 reps gross grasp alternating l/r.  Therapeutic Exercise Activity 3: Bilateral hand/forearm strengthening ex with 20# flex bar, 2x10 reps bilateral supination then pronation.    Manual Therapy  Manual Therapy Activity 1: Performed 3 min scar massage to digits 3,4,5 of left hand followed by joint mobilization, all joints all planes, with end range stretching to  tolerance.    Modalities  Cryotherapy (Minutes\Location): End of session, cold pack applied to left hand 5 mins to decrease edema and prevent soreness from increase activity.  Paraffin Bath: Begining of session paraffin applied to left hand 5 mins to increase circulation to promote healing.     Assessment & Plan   Assessment: Pt seen in clinic setting.  Pt 1 hour early and well groomed.  Performed reassessment and noted definite improvements with left hand digit AROM,  strength, and pinch strength. (See initial evaluation for previous measurements.)  Pt also reported and increase on the FOTO outcome measure from 43 on the initial evaluation to 50 today.  Luke was able to achieve 3/3 short term goals set and is independent with his home program.  He does continue to report his pain 4/10 in the left middle digit as well as sensory deficits in the volar aspects of middle and ring fingers affecting hand function when performing IADL activities.  Evaluation/Treatment Tolerance: Patient tolerated treatment well    Patient will continue to benefit from skilled outpatient occupational therapy to address the deficits listed in the problem list box on initial evaluation, provide pt/family education and to maximize pt's level of independence in the home and community environment.     Patient's spiritual,  cultural, and educational needs considered and patient agreeable to plan of care and goals.           Plan: OT to focus next session on pain/scar management, left hand AROM/strength/FMC activities, formal reassessment, review/modification of HEP.    Goals:   Active       Complete Long Term Goals in 8 weeks       Pt will increase left hand AROM and strength to be able to carry a shopping bag with minimal difficulty. (Progressing)       Start:  01/29/25    Expected End:  03/26/25            Pt will increase left hand FMC to perform dressing activity with minimal difficulty. (Progressing)       Start:  01/29/25    Expected End:  03/26/25            Pt will report and increase on the FOTO outcome measure to 52 or better. (Progressing)       Start:  01/29/25    Expected End:  03/26/25              Resolved       Complete Short Term Goals in 4 weeks       Pt will increase left hand  strength 5# and pinch strength 2 # to be able to carry a shopping bag with moderate difficulty. (Met)       Start:  01/29/25    Expected End:  02/26/25    Resolved:  02/19/25         Pt will increase left hand AROM to be able to turn a key with moderate difficulty. (Met)       Start:  01/29/25    Expected End:  02/26/25    Resolved:  02/19/25         Pt will be educated on a home exercise program and perform independently. (Met)       Start:  01/29/25    Expected End:  02/26/25    Resolved:  02/04/25         Pt will report and increase on the FOTO outcome measure to 40 or better. (Met)       Start:  01/29/25    Expected End:  02/26/25    Resolved:  02/19/25             Siddhartha Mercado OT

## 2025-02-21 ENCOUNTER — CLINICAL SUPPORT (OUTPATIENT)
Dept: REHABILITATION | Facility: HOSPITAL | Age: 45
End: 2025-02-21
Payer: MEDICAID

## 2025-02-21 DIAGNOSIS — M79.642 LEFT HAND PAIN: Primary | ICD-10-CM

## 2025-02-21 PROCEDURE — 97530 THERAPEUTIC ACTIVITIES: CPT

## 2025-02-21 NOTE — PROGRESS NOTES
Outpatient Rehab    Occupational Therapy Visit    Patient Name: Luke Crowder Jr.  MRN: [id number]  YOB: 1980  Today's Date: 2/21/2025    Therapy Diagnosis:   Encounter Diagnosis   Name Primary?    Left hand pain Yes     Physician: Dean Kelley MD    Physician Orders: Eval and Treat    Visit # 7   Time In: 1105   Time Out: 1155  Total Time: 50   Total Billable Time: 40    FOTO:  Intake Score: 43%  Survey Score 1: 50%  Survey Score 2:  %         Subjective   Luke reported left hand is doing better noting increase in digit AROM and hand strength, continues to report tenderness in the DIP joint of middle digit..  Pain reported as 4/10.      Objective           Treatment:  Therapeutic Exercise  Therapeutic Exercise Activity 1: Seated left forearm strengthening with 4# dumbbell over towel roll, 3x10 reps wrist ext  Therapeutic Exercise Activity 2: Bilateral hand strengthening ex with gripper set at 35 # resistance 3x10 reps gross grasp alternating l/r.  Therapeutic Exercise Activity 3: Bilateral hand/forearm strengthening ex with 20# flex bar, 3x10 reps bilateral supination then pronation.  Therapeutic Exercise Activity 5: Left hand strength/FMC activity cutting 10, 8-inch strips of manilla folder with scissors.    Manual Therapy  Manual Therapy Activity 1: Performed 3 min scar massage to digits 3,4,5 of left hand followed by joint mobilization, all joints all planes, with end range stretching to  tolerance.    Modalities  Cryotherapy (Minutes\Location): End of session, cold pack applied to left hand 5 mins to decrease edema and prevent soreness from increase activity.  Paraffin Bath: Begining of session paraffin applied to left hand 5 mins to increase circulation to promote healing.     Assessment & Plan   Assessment: Pt seen in clinic setting.  Pt 2 hours early and well groomed.  Luke tolerating increased left hand strenthening ex's.  He reported noting definite improvements with left hand  digt AROM and strength.  He does continue to report 4/10 pain in the left middle digit as well as sensory deficits in the volar aspects of middle and ring fingers affecting hand function when performing IADL activities.  Evaluation/Treatment Tolerance: Patient tolerated treatment well    Patient will continue to benefit from skilled outpatient occupational therapy to address the deficits listed in the problem list box on initial evaluation, provide pt/family education and to maximize pt's level of independence in the home and community environment.     Patient's spiritual, cultural, and educational needs considered and patient agreeable to plan of care and goals.           Plan: OT to focus next session on pain/scar management, left hand AROM/strength/FMC activities, review/modification of HEP.    Goals:   Active       Complete Long Term Goals in 8 weeks       Pt will increase left hand AROM and strength to be able to carry a shopping bag with minimal difficulty. (Progressing)       Start:  01/29/25    Expected End:  03/26/25            Pt will increase left hand FMC to perform dressing activity with minimal difficulty. (Progressing)       Start:  01/29/25    Expected End:  03/26/25            Pt will report and increase on the FOTO outcome measure to 52 or better. (Progressing)       Start:  01/29/25    Expected End:  03/26/25              Resolved       Complete Short Term Goals in 4 weeks       Pt will increase left hand  strength 5# and pinch strength 2 # to be able to carry a shopping bag with moderate difficulty. (Met)       Start:  01/29/25    Expected End:  02/26/25    Resolved:  02/19/25         Pt will increase left hand AROM to be able to turn a key with moderate difficulty. (Met)       Start:  01/29/25    Expected End:  02/26/25    Resolved:  02/19/25         Pt will be educated on a home exercise program and perform independently. (Met)       Start:  01/29/25    Expected End:  02/26/25    Resolved:   02/04/25         Pt will report and increase on the FOTO outcome measure to 40 or better. (Met)       Start:  01/29/25    Expected End:  02/26/25    Resolved:  02/19/25             Siddhartha Mercado OT

## 2025-02-25 ENCOUNTER — CLINICAL SUPPORT (OUTPATIENT)
Dept: REHABILITATION | Facility: HOSPITAL | Age: 45
End: 2025-02-25
Payer: MEDICAID

## 2025-02-25 DIAGNOSIS — M79.642 LEFT HAND PAIN: Primary | ICD-10-CM

## 2025-02-25 PROCEDURE — 97530 THERAPEUTIC ACTIVITIES: CPT

## 2025-02-26 NOTE — PROGRESS NOTES
Outpatient Rehab    Occupational Therapy Visit    Patient Name: Luke Crowder Jr.  MRN: [id number]  YOB: 1980  Encounter Date: 2/25/2025    Therapy Diagnosis:   Encounter Diagnosis   Name Primary?    Left hand pain Yes     Physician: Dean Kelley MD    Physician Orders: Eval and Treat    Visit # 8   Time In: 1100   Time Out: 1155  Total Time: 55   Total Billable Time: 40    FOTO:  Intake Score: 43%  Survey Score 1: 50%  Survey Score 2:  %         Subjective   Luke reported left hand is doing better, but contiues to have difficulty grasping objects..  Pain reported as 5/10. Luke reported cold weather irritating left middle digit.    Objective           Treatment:  Therapeutic Exercise  Therapeutic Exercise Activity 1: Seated left forearm strengthening with 4# dumbbell over towel roll, 3x10 reps wrist ext  Therapeutic Exercise Activity 3: Bilateral hand/forearm strengthening ex with 20# flex bar, 3x10 reps bilateral supination then pronation.  Therapeutic Exercise Activity 6: End of session applied KT tape to left middle and ring fingers to decrease joint instability and decrease discomfort.    Therapeutic Activity  Therapeutic Activity 1: Perfomed 3 min scar massage to volar aspect of left middle finger then 2 min scar massage to 4-5th digits.  Therapeutic Activity 2: Performed joint mobilization with end range stretching all joints all planes left hand digits 3,4,5  Therapeutic Activity 4: Left hand stregth/FMC activity with pipe tree assembly.  Therapeutic Activity 5: Left hand FMC activity crumpling 5 sheets of copy paper focusing on finger isolation.  Noted increase in finger dexterity and speed.    Modalities  Cryotherapy (Minutes\Location): End of session, cold pack applied to left hand 5 mins to decrease edema and prevent soreness from increase activity.  Paraffin Bath: Begining of session paraffin applied to left hand 5 mins to increase circulation to promote healing.     Assessment  & Plan   Assessment: Pt seen in clinic setting.  Pt early for session and well groomed.  Conitnue to note increased left middle and ring finger AROM and scar tissue conts to mature well.  Noted increase in FMC and finger isolation.  Luke reported using his left hand to perfom all IADL activities but conts to report difficulty with gross grasp.  Evaluation/Treatment Tolerance: Patient tolerated treatment well    Patient will continue to benefit from skilled outpatient occupational therapy to address the deficits listed in the problem list box on initial evaluation, provide pt/family education and to maximize pt's level of independence in the home and community environment.     Patient's spiritual, cultural, and educational needs considered and patient agreeable to plan of care and goals.     Education  Education was done with Patient. The patient's learning style includes Listening. The patient Verbalizes understanding.         Educated pt on removing KT tape in 3 days or at any time skin becomes irritated.        Plan: OT to focus next session on pain/scar management, left hand AROM/strength/FMC activities, review/modification of HEP.    Goals:   Active       Complete Long Term Goals in 8 weeks       Pt will increase left hand AROM and strength to be able to carry a shopping bag with minimal difficulty. (Progressing)       Start:  01/29/25    Expected End:  03/26/25            Pt will increase left hand FMC to perform dressing activity with minimal difficulty. (Progressing)       Start:  01/29/25    Expected End:  03/26/25            Pt will report and increase on the FOTO outcome measure to 52 or better. (Progressing)       Start:  01/29/25    Expected End:  03/26/25              Resolved       Complete Short Term Goals in 4 weeks       Pt will increase left hand  strength 5# and pinch strength 2 # to be able to carry a shopping bag with moderate difficulty. (Met)       Start:  01/29/25    Expected End:   02/26/25    Resolved:  02/19/25         Pt will increase left hand AROM to be able to turn a key with moderate difficulty. (Met)       Start:  01/29/25    Expected End:  02/26/25    Resolved:  02/19/25         Pt will be educated on a home exercise program and perform independently. (Met)       Start:  01/29/25    Expected End:  02/26/25    Resolved:  02/04/25         Pt will report and increase on the FOTO outcome measure to 40 or better. (Met)       Start:  01/29/25    Expected End:  02/26/25    Resolved:  02/19/25             Siddhartha Mercado, OT

## 2025-03-03 ENCOUNTER — CLINICAL SUPPORT (OUTPATIENT)
Dept: REHABILITATION | Facility: HOSPITAL | Age: 45
End: 2025-03-03
Payer: MEDICAID

## 2025-03-03 DIAGNOSIS — M79.642 LEFT HAND PAIN: Primary | ICD-10-CM

## 2025-03-03 PROCEDURE — 97530 THERAPEUTIC ACTIVITIES: CPT

## 2025-03-03 NOTE — PROGRESS NOTES
Outpatient Rehab    Occupational Therapy Visit    Patient Name: Luke Crowder Jr.  MRN: [id number]  YOB: 1980  Encounter Date: 3/3/2025    Therapy Diagnosis:   Encounter Diagnosis   Name Primary?    Left hand pain Yes     Physician: Dean Kelley MD    Physician Orders: Eval and Treat    Visit # 9     Time In: 1055   Time Out: 1148  Total Time: 53   Total Billable Time: 43    FOTO:  Intake Score: 43%  Survey Score 1: 50%  Survey Score 2:  %         Subjective   Luke reports left hand is good well except occasional tenderness in middle digit when grasping objects..  Pain reported as 2/10.      Objective           Treatment:  Therapeutic Exercise  Therapeutic Exercise Activity 1: Seated left forearm strengthening with 4# dumbbell over towel roll, 3x10 reps wrist ext  Therapeutic Exercise Activity 2: Bilateral hand strengthening ex with gripper set at 35 # resistance 3x10 reps gross grasp alternating l/r.  Therapeutic Exercise Activity 3: Bilateral hand/forearm strengthening ex with 20# flex bar, 3x10 reps alternating supination then pronation.  Therapeutic Exercise Activity 4: Bilateral hand/forearm strengtheing ex with dowels on velcro board, performed resisted digit flex/ext 3x10 reps, sup/pron followed by wrist flex/ext 3x10 reps alternating l/r    Manual Therapy  Manual Therapy Activity 1: Performed 3 min scar massage to digits 3,4,5 of left hand followed by joint mobilization, all joints all planes, with end range stretching to  tolerance.    Modalities  Cryotherapy (Minutes\Location): End of session, cold pack applied to left hand 5 mins to decrease edema and prevent soreness from increase activity.     Assessment & Plan   Assessment: Pt seen in clinic setting.  Pt 1.5 hours early for session and well groomed.  Conitnue to note increased left middle and ring finger AROM and scar tissue conts to mature well.  Noted increases in  and pinch strength.  Luke reported using his left hand  to perfom all IADL activities but conts to report difficulty with gross grasp secondary to tenderness in the middle digit.  Evaluation/Treatment Tolerance: Patient tolerated treatment well    Patient will continue to benefit from skilled outpatient occupational therapy to address the deficits listed in the problem list box on initial evaluation, provide pt/family education and to maximize pt's level of independence in the home and community environment.     Patient's spiritual, cultural, and educational needs considered and patient agreeable to plan of care and goals.           Plan: OT to focus next session on pain/scar management, left hand AROM/strength/FMC activities, review/modification of HEP.    Goals:   Active       Complete Long Term Goals in 8 weeks       Pt will increase left hand AROM and strength to be able to carry a shopping bag with minimal difficulty. (Progressing)       Start:  01/29/25    Expected End:  03/26/25            Pt will increase left hand FMC to perform dressing activity with minimal difficulty. (Progressing)       Start:  01/29/25    Expected End:  03/26/25            Pt will report and increase on the FOTO outcome measure to 52 or better. (Progressing)       Start:  01/29/25    Expected End:  03/26/25              Resolved       Complete Short Term Goals in 4 weeks       Pt will increase left hand  strength 5# and pinch strength 2 # to be able to carry a shopping bag with moderate difficulty. (Met)       Start:  01/29/25    Expected End:  02/26/25    Resolved:  02/19/25         Pt will increase left hand AROM to be able to turn a key with moderate difficulty. (Met)       Start:  01/29/25    Expected End:  02/26/25    Resolved:  02/19/25         Pt will be educated on a home exercise program and perform independently. (Met)       Start:  01/29/25    Expected End:  02/26/25    Resolved:  02/04/25         Pt will report and increase on the FOTO outcome measure to 40 or better. (Met)        Start:  01/29/25    Expected End:  02/26/25    Resolved:  02/19/25             Siddhartha Mercado OT

## 2025-03-07 ENCOUNTER — CLINICAL SUPPORT (OUTPATIENT)
Dept: REHABILITATION | Facility: HOSPITAL | Age: 45
End: 2025-03-07
Payer: MEDICAID

## 2025-03-07 DIAGNOSIS — M79.642 LEFT HAND PAIN: Primary | ICD-10-CM

## 2025-03-07 PROCEDURE — 97530 THERAPEUTIC ACTIVITIES: CPT

## 2025-03-07 NOTE — PROGRESS NOTES
Outpatient Rehab    Occupational Therapy Visit    Patient Name: Luke Crowder Jr.  MRN: [id number]  YOB: 1980  Encounter Date: 3/7/2025    Therapy Diagnosis:   Encounter Diagnosis   Name Primary?    Left hand pain Yes     Physician: Dean Kelley MD    Physician Orders: Eval and Treat  Visit # 10   Time In: 1030   Time Out: 1125  Total Time: 55   Total Billable Time: 45    FOTO:  Intake Score: 43%  Survey Score 1: 50%  Survey Score 2:  %         Subjective   Luke reported that the compression glove is helping with discomfort and edema..  Pain reported as 0/10.      Objective           Treatment:  Therapeutic Exercise  Therapeutic Exercise Activity 1: Seated left forearm strengthening with 4# dumbbell over towel roll, 3x10 reps wrist ext  Therapeutic Exercise Activity 2: Bilateral hand strengthening ex with gripper set at 35 # resistance 3x10 reps gross grasp alternating l/r.  Therapeutic Exercise Activity 3: Bilateral hand/forearm strengthening ex with 20# flex bar, 3x10 reps alternating supination then pronation.  Therapeutic Exercise Activity 7: Bilateral hand strength and FMC activity with medium hard resistance theraputty and nut/bolt board.  Pt unscrewed 10 various size nuts from bolt board, removed from theraputty using lateral/tripod/tip to tip pinch patterns then reapplied to proper bolt.    Manual Therapy  Manual Therapy Activity 1: Performed 3 min scar massage to digits 3,4,5 of left hand followed by joint mobilization, all joints all planes, with end range stretching to  tolerance.    Modalities  Cryotherapy (Minutes\Location): End of session, cold pack applied to left hand 5 mins to decrease edema and prevent soreness from increase activity.  Paraffin Bath: Begining of session paraffin applied to left hand 5 mins to increase circulation to promote healing.     Assessment & Plan   Assessment: Pt seen in clinic setting.  Pt 1.5 hours early for session and well groomed.  Conitnue to  note increased left middle and ring finger AROM and scar tissue conts to mature well.  Continue to note increased  and pinch strength as well as left hand FMC.  He had aquired a left hand compression glove recommended last session and reported possitive results.  uLke reported using his left hand to perfom all IADL activities but conts to report difficulty with gross grasp secondary to tenderness in the middle digit.  Evaluation/Treatment Tolerance: Patient tolerated treatment well    Patient will continue to benefit from skilled outpatient occupational therapy to address the deficits listed in the problem list box on initial evaluation, provide pt/family education and to maximize pt's level of independence in the home and community environment.     Patient's spiritual, cultural, and educational needs considered and patient agreeable to plan of care and goals.           Plan: OT to focus next session on pain/scar management, left hand AROM/strength/FMC activities, review/modification of HEP.    Goals:   Active       Complete Long Term Goals in 8 weeks       Pt will increase left hand AROM and strength to be able to carry a shopping bag with minimal difficulty. (Progressing)       Start:  01/29/25    Expected End:  03/26/25            Pt will increase left hand FMC to perform dressing activity with minimal difficulty. (Progressing)       Start:  01/29/25    Expected End:  03/26/25            Pt will report and increase on the FOTO outcome measure to 52 or better. (Progressing)       Start:  01/29/25    Expected End:  03/26/25              Resolved       Complete Short Term Goals in 4 weeks       Pt will increase left hand  strength 5# and pinch strength 2 # to be able to carry a shopping bag with moderate difficulty. (Met)       Start:  01/29/25    Expected End:  02/26/25    Resolved:  02/19/25         Pt will increase left hand AROM to be able to turn a key with moderate difficulty. (Met)       Start:   01/29/25    Expected End:  02/26/25    Resolved:  02/19/25         Pt will be educated on a home exercise program and perform independently. (Met)       Start:  01/29/25    Expected End:  02/26/25    Resolved:  02/04/25         Pt will report and increase on the FOTO outcome measure to 40 or better. (Met)       Start:  01/29/25    Expected End:  02/26/25    Resolved:  02/19/25             Siddhartha Mercado, OT

## 2025-03-10 ENCOUNTER — CLINICAL SUPPORT (OUTPATIENT)
Dept: REHABILITATION | Facility: HOSPITAL | Age: 45
End: 2025-03-10
Payer: MEDICAID

## 2025-03-10 DIAGNOSIS — M79.642 LEFT HAND PAIN: Primary | ICD-10-CM

## 2025-03-10 PROCEDURE — 97530 THERAPEUTIC ACTIVITIES: CPT

## 2025-03-10 NOTE — PROGRESS NOTES
Outpatient Rehab    Occupational Therapy Visit    Patient Name: Luke Crowder Jr.  MRN: [id number]  YOB: 1980  Encounter Date: 3/10/2025    Therapy Diagnosis:   Encounter Diagnosis   Name Primary?    Left hand pain Yes     Physician: Dean Kelley MD    Physician Orders: Eval and Treat    Visit # 11   Time In: 1055   Time Out: 1145  Total Time: 50   Total Billable Time: 40    FOTO:  Intake Score: 43%  Survey Score 1: 50%  Survey Score 2:  %         Subjective   Luke reported left hand middle digit is sore today from increased activity over the weekend.  He reported performing his hoddy of sculpting crucifixes..  Pain reported as 3/10.      Objective           Treatment:  Therapeutic Exercise  Therapeutic Exercise Activity 1: Seated left forearm strengthening with 4# dumbbell over towel roll, 3x10 reps wrist ext  Therapeutic Exercise Activity 2: Bilateral hand strengthening ex with gripper set at 35 # resistance 3x10 reps gross grasp alternating l/r.  Therapeutic Exercise Activity 3: Bilateral hand/forearm strengthening ex with 20# flex bar, 3x10 reps alternating supination then pronation.  Therapeutic Exercise Activity 8: Left hand strengthening activity, reaching and placeing graded clothes pins.  Using tripod then lateral pinch patterns, pt placed/removed 5 sets of 5 pins with resistance 3-16# resistance.         Modalities  Cryotherapy (Minutes\Location): End of session, cold pack applied to left hand 5 mins to decrease edema and prevent soreness from increase activity.  Paraffin Bath: Begining of session paraffin applied to left hand 5 mins to increase circulation to promote healing.     Assessment & Plan   Assessment: Pt seen in clinic setting.  Pt 1.5 hours early for session and well groomed.  Conitnue to note increased left middle and ring finger AROM and scar tissue conts to mature well.  Continue to note increased  and pinch strength as well as left hand FMC. Began discharge  planning and will complete next session.  Luke reported using his left hand to perfom all IADL activities but conts to report difficulty with gross grasp secondary to tenderness in the middle digit.  Evaluation/Treatment Tolerance: Patient tolerated treatment well    Patient will continue to benefit from skilled outpatient occupational therapy to address the deficits listed in the problem list box on initial evaluation, provide pt/family education and to maximize pt's level of independence in the home and community environment.     Patient's spiritual, cultural, and educational needs considered and patient agreeable to plan of care and goals.           Plan: OT to focus next session on pain/scar management, left hand AROM/strength/FMC activities, review/modification of HEP, complete discharge planning.    Goals:   Active       Complete Long Term Goals in 8 weeks       Pt will increase left hand AROM and strength to be able to carry a shopping bag with minimal difficulty. (Progressing)       Start:  01/29/25    Expected End:  03/26/25            Pt will increase left hand FMC to perform dressing activity with minimal difficulty. (Progressing)       Start:  01/29/25    Expected End:  03/26/25            Pt will report and increase on the FOTO outcome measure to 52 or better. (Progressing)       Start:  01/29/25    Expected End:  03/26/25              Resolved       Complete Short Term Goals in 4 weeks       Pt will increase left hand  strength 5# and pinch strength 2 # to be able to carry a shopping bag with moderate difficulty. (Met)       Start:  01/29/25    Expected End:  02/26/25    Resolved:  02/19/25         Pt will increase left hand AROM to be able to turn a key with moderate difficulty. (Met)       Start:  01/29/25    Expected End:  02/26/25    Resolved:  02/19/25         Pt will be educated on a home exercise program and perform independently. (Met)       Start:  01/29/25    Expected End:  02/26/25     Resolved:  02/04/25         Pt will report and increase on the FOTO outcome measure to 40 or better. (Met)       Start:  01/29/25    Expected End:  02/26/25    Resolved:  02/19/25             Siddhartha Mercado, OT

## 2025-03-12 ENCOUNTER — CLINICAL SUPPORT (OUTPATIENT)
Dept: REHABILITATION | Facility: HOSPITAL | Age: 45
End: 2025-03-12
Payer: MEDICAID

## 2025-03-12 DIAGNOSIS — M79.642 LEFT HAND PAIN: Primary | ICD-10-CM

## 2025-03-12 PROCEDURE — 97530 THERAPEUTIC ACTIVITIES: CPT

## 2025-03-13 NOTE — PROGRESS NOTES
Outpatient Rehab    Occupational Therapy Discharge    Patient Name: Luke Crowder Jr.  MRN: [id number]  YOB: 1980  Encounter Date: 3/12/2025    Therapy Diagnosis:   Encounter Diagnosis   Name Primary?    Left hand pain Yes     Physician: Dean Kelley MD    Physician Orders: Eval and Treat    Visit # 12     Time In: 1305   Time Out: 1355  Total Time: 50   Total Billable Time: 45    FOTO:  Intake Score: 43%  Survey Score 1: 50%  Survey Score 2:  %         Subjective   Luke reported being very please with his progress and is using his left hand to perform all ADL/IADL activities with minimal discomfort..  Pain reported as 2/10.      Objective      (Initial Evaluation)   Digit 2 - Index Finger Active Range of Motion  Left Index Finger    Flexion (deg) Extension (deg) Pain   MCP 70       PIP 80       DIP 60       ABDULLAHI:       Digit 3 - Middle Finger Active Range of Motion  Left Middle Finger    Flexion (deg) Extension (deg) Pain   MCP 75       PIP 85       DIP 37   Yes   ABDULLAHI:       Digit 4 - Ring Finger Active Range of Motion  Left Ring Finger    Flexion (deg) Extension (deg) Pain   MCP 65       PIP 82       DIP 37   Yes   ABDULLAHI:       Digit 5 - Little Finger Active Range of Motion  Left Little Finger    Flexion (deg) Extension (deg) Pain   MCP 65       PIP 75       DIP 37   Yes     Tested thumb opposition using Kapandji scale, right hand 10/10, left 10/10     Right  Strength  Right Hand Dynamometer Position: 2  Elbow Position Forearm Position Trial 1 (lbs) Trial 2  (lbs) Trial 3  (lbs) Average  (lbs) Pain   Flexed Neutral 39 25 25 29.67           Left  Strength  Left Hand Dynamometer Position: 2  Elbow Position Forearm Position Trial 1 (lbs) Trial 2 (lbs) Trial 3 (lbs) Average (lbs) Pain   Flexed Neutral 13 10 5 9.33           Right Pinch Strength    Trial 1 (lbs) Trial 2 (lbs) Trial 3 (lbs) Average (lbs) Pain   Lateral (Key Pinch) 5           Three Point (Three Jaw Ean) 4           Two  Point (Tip to Tip) 4                 Left Pinch Strength    Trial 1 (lbs) Trial 2 (lbs) Trial 3 (lbs) Average (lbs) Pain   Lateral (Key Pinch) 3           Three Point (Three Jaw Ean) 5           Two Point (Tip to Tip) 2              Wrist/Hand Special Tests  Hand Coordination Special Tests  Right 9-Hole Peg Test (sec): 24  Left 9-Hole Peg Test (sec): 25    Coordination  Right 9-Hole Peg Test (sec): 24  Left 9-Hole Peg Test (sec): 25      (Discharge Evaluation)    Digit 2 - Index Finger Active Range of Motion  Left Index Finger   Extension (deg) Flexion (deg) Pain   MCP   80     PIP   104     DIP   60     ABDULLAHI:      Digit 3 - Middle Finger Active Range of Motion  Left Middle Finger   Extension (deg) Flexion (deg) Pain   MCP   84     PIP   103     DIP   54     ABDULLAHI:      Digit 4 - Ring Finger Active Range of Motion  Left Ring Finger   Extension (deg) Flexion (deg) Pain   MCP   80     PIP   101     DIP   30     ABDULLAHI:      Digit 5 - Little Finger Active Range of Motion  Left Little Finger   Extension (deg) Flexion (deg) Pain   MCP   85     PIP   100     DIP   46     ABDULLAHI:                          Left  Strength  Left Hand Dynamometer Position: 2  Elbow Position Forearm Position Trial 1 (lbs) Trial 2 (lbs) Trial 3 (lbs) Average (lbs) Pain   Flexed Neutral 40 35 35 36.67         Left Pinch Strength   Trial 1 (lbs) Trial 2 (lbs) Trial 3 (lbs) Average (lbs) Pain   Lateral (Key Pinch) 16           Three Point (Three Jaw Ean) 7           Two Point (Tip to Tip) 7                      Wrist/Hand Special Tests  Hand Coordination Special Tests  Left 9-Hole Peg Test (sec): 23       Coordination  Left 9-Hole Peg Test (sec): 23  Left Grooved Peg Test: 23               Treatment:  Therapeutic Exercise  Therapeutic Exercise Activity 1: Seated left forearm strengthening with 4# dumbbell over towel roll, 3x10 reps wrist ext  Therapeutic Exercise Activity 2: Bilateral hand strengthening ex with gripper set at 35 # resistance 3x10  reps gross grasp alternating l/r.  Therapeutic Exercise Activity 3: Bilateral hand/forearm strengthening ex with 20# flex bar, 3x10 reps alternating supination then pronation.    Manual Therapy  Manual Therapy Activity 1: Performed 3 min scar massage to digits 3,4,5 of left hand followed by joint mobilization, all joints all planes, with end range stretching to  tolerance.          Assessment & Plan   Assessment: Luke seen in out patient setting.  He was 1 hour early and well groomed.  Luke has attended 12 sessions of OT and has demonstrated definite progress with left hand function.  He has increased left hand AROM to WFLs in digits 3-5.  He has increase left hand  strength from 9# to 35#, and has increase left hand fine motor coordination as per an increase on the 9-Hole Peg Test from 27 seconds to 23 seconds.  Luke was able to achieve 6/7 goals set and is performing all ADL/IADL activities with minimal discofort.  He does continue to report occasional discomfort and edema in the left middle digit when performing prolonged grasping activities.  Luke was educated on symptom management and is able to perform independently.  Evaluation/Treatment Tolerance: Patient tolerated treatment well    Patient's spiritual, cultural, and educational needs considered and patient agreeable to plan of care and goals.           Plan: Discharge fron OT secondary to all goals being acheived.    Goals:   Active       Complete Long Term Goals in 8 weeks       Pt will increase left hand AROM and strength to be able to carry a shopping bag with minimal difficulty. (Met)       Start:  01/29/25    Expected End:  03/26/25    Resolved:  03/12/25         Pt will increase left hand FMC to perform dressing activity with minimal difficulty. (Met)       Start:  01/29/25    Expected End:  03/26/25    Resolved:  03/12/25         Pt will report and increase on the FOTO outcome measure to 52 or better. (Progressing)       Start:  01/29/25     Expected End:  03/26/25              Resolved       Complete Short Term Goals in 4 weeks       Pt will increase left hand  strength 5# and pinch strength 2 # to be able to carry a shopping bag with moderate difficulty. (Met)       Start:  01/29/25    Expected End:  02/26/25    Resolved:  02/19/25         Pt will increase left hand AROM to be able to turn a key with moderate difficulty. (Met)       Start:  01/29/25    Expected End:  02/26/25    Resolved:  02/19/25         Pt will be educated on a home exercise program and perform independently. (Met)       Start:  01/29/25    Expected End:  02/26/25    Resolved:  02/04/25         Pt will report and increase on the FOTO outcome measure to 40 or better. (Met)       Start:  01/29/25    Expected End:  02/26/25    Resolved:  02/19/25             Thank you for allowing me to assist in the care of this kind fellow.    Siddhartha Mercado, OT

## 2025-04-15 ENCOUNTER — LAB VISIT (OUTPATIENT)
Dept: LAB | Facility: HOSPITAL | Age: 45
End: 2025-04-15
Attending: NURSE PRACTITIONER
Payer: MEDICAID

## 2025-04-15 ENCOUNTER — OFFICE VISIT (OUTPATIENT)
Dept: ENDOCRINOLOGY | Facility: CLINIC | Age: 45
End: 2025-04-15
Payer: MEDICAID

## 2025-04-15 VITALS
HEIGHT: 67 IN | TEMPERATURE: 98 F | WEIGHT: 236.31 LBS | BODY MASS INDEX: 37.09 KG/M2 | RESPIRATION RATE: 18 BRPM | HEART RATE: 87 BPM | DIASTOLIC BLOOD PRESSURE: 126 MMHG | SYSTOLIC BLOOD PRESSURE: 189 MMHG

## 2025-04-15 DIAGNOSIS — E11.65 TYPE 2 DIABETES MELLITUS WITH HYPERGLYCEMIA, WITH LONG-TERM CURRENT USE OF INSULIN: ICD-10-CM

## 2025-04-15 DIAGNOSIS — E11.65 TYPE 2 DIABETES MELLITUS WITH HYPERGLYCEMIA, WITH LONG-TERM CURRENT USE OF INSULIN: Primary | ICD-10-CM

## 2025-04-15 DIAGNOSIS — E11.65 UNCONTROLLED TYPE 2 DIABETES MELLITUS WITH HYPERGLYCEMIA: ICD-10-CM

## 2025-04-15 DIAGNOSIS — Z79.4 TYPE 2 DIABETES MELLITUS WITH HYPERGLYCEMIA, WITH LONG-TERM CURRENT USE OF INSULIN: ICD-10-CM

## 2025-04-15 DIAGNOSIS — Z79.4 TYPE 2 DIABETES MELLITUS WITH HYPERGLYCEMIA, WITH LONG-TERM CURRENT USE OF INSULIN: Primary | ICD-10-CM

## 2025-04-15 LAB
ALBUMIN SERPL-MCNC: 4 G/DL (ref 3.5–5)
ALBUMIN/GLOB SERPL: 1.1 RATIO (ref 1.1–2)
ALP SERPL-CCNC: 115 UNIT/L (ref 40–150)
ALT SERPL-CCNC: 34 UNIT/L (ref 0–55)
ANION GAP SERPL CALC-SCNC: 9 MEQ/L
AST SERPL-CCNC: 22 UNIT/L (ref 11–45)
BILIRUB SERPL-MCNC: 0.7 MG/DL
BUN SERPL-MCNC: 10.6 MG/DL (ref 8.9–20.6)
CALCIUM SERPL-MCNC: 9.3 MG/DL (ref 8.4–10.2)
CHLORIDE SERPL-SCNC: 103 MMOL/L (ref 98–107)
CHOLEST SERPL-MCNC: 209 MG/DL
CHOLEST/HDLC SERPL: 5 {RATIO} (ref 0–5)
CO2 SERPL-SCNC: 25 MMOL/L (ref 22–29)
CREAT SERPL-MCNC: 0.86 MG/DL (ref 0.72–1.25)
CREAT UR-MCNC: 124.8 MG/DL (ref 63–166)
CREAT/UREA NIT SERPL: 12
GFR SERPLBLD CREATININE-BSD FMLA CKD-EPI: >60 ML/MIN/1.73/M2
GLOBULIN SER-MCNC: 3.7 GM/DL (ref 2.4–3.5)
GLUCOSE SERPL-MCNC: 321 MG/DL (ref 74–100)
HBA1C MFR BLD: 11.6 %
HDLC SERPL-MCNC: 41 MG/DL (ref 35–60)
LDLC SERPL CALC-MCNC: 136 MG/DL (ref 50–140)
MICROALBUMIN UR-MCNC: 41.1 UG/ML
MICROALBUMIN/CREAT RATIO PNL UR: 32.9 MG/GM CR (ref 0–30)
POTASSIUM SERPL-SCNC: 4 MMOL/L (ref 3.5–5.1)
PROT SERPL-MCNC: 7.7 GM/DL (ref 6.4–8.3)
SODIUM SERPL-SCNC: 137 MMOL/L (ref 136–145)
TRIGL SERPL-MCNC: 159 MG/DL (ref 34–140)
VLDLC SERPL CALC-MCNC: 32 MG/DL

## 2025-04-15 PROCEDURE — 1159F MED LIST DOCD IN RCRD: CPT | Mod: CPTII,,, | Performed by: NURSE PRACTITIONER

## 2025-04-15 PROCEDURE — 99204 OFFICE O/P NEW MOD 45 MIN: CPT | Mod: S$PBB,,, | Performed by: NURSE PRACTITIONER

## 2025-04-15 PROCEDURE — 36415 COLL VENOUS BLD VENIPUNCTURE: CPT

## 2025-04-15 PROCEDURE — 83036 HEMOGLOBIN GLYCOSYLATED A1C: CPT | Mod: PBBFAC | Performed by: NURSE PRACTITIONER

## 2025-04-15 PROCEDURE — 4010F ACE/ARB THERAPY RXD/TAKEN: CPT | Mod: CPTII,,, | Performed by: NURSE PRACTITIONER

## 2025-04-15 PROCEDURE — 3080F DIAST BP >= 90 MM HG: CPT | Mod: CPTII,,, | Performed by: NURSE PRACTITIONER

## 2025-04-15 PROCEDURE — 86341 ISLET CELL ANTIBODY: CPT

## 2025-04-15 PROCEDURE — 3077F SYST BP >= 140 MM HG: CPT | Mod: CPTII,,, | Performed by: NURSE PRACTITIONER

## 2025-04-15 PROCEDURE — 1160F RVW MEDS BY RX/DR IN RCRD: CPT | Mod: CPTII,,, | Performed by: NURSE PRACTITIONER

## 2025-04-15 PROCEDURE — 3008F BODY MASS INDEX DOCD: CPT | Mod: CPTII,,, | Performed by: NURSE PRACTITIONER

## 2025-04-15 PROCEDURE — 99214 OFFICE O/P EST MOD 30 MIN: CPT | Mod: PBBFAC | Performed by: NURSE PRACTITIONER

## 2025-04-15 PROCEDURE — 3046F HEMOGLOBIN A1C LEVEL >9.0%: CPT | Mod: CPTII,,, | Performed by: NURSE PRACTITIONER

## 2025-04-15 PROCEDURE — 80061 LIPID PANEL: CPT

## 2025-04-15 PROCEDURE — 82043 UR ALBUMIN QUANTITATIVE: CPT | Performed by: NURSE PRACTITIONER

## 2025-04-15 PROCEDURE — 84681 ASSAY OF C-PEPTIDE: CPT

## 2025-04-15 PROCEDURE — 80053 COMPREHEN METABOLIC PANEL: CPT

## 2025-04-15 RX ORDER — POLYETHYLENE GLYCOL 3350 17 G/17G
17 POWDER, FOR SOLUTION ORAL
COMMUNITY
Start: 2025-01-19

## 2025-04-15 RX ORDER — NITROGLYCERIN 0.4 MG/1
TABLET SUBLINGUAL
COMMUNITY
Start: 2024-12-12

## 2025-04-15 RX ORDER — LANCETS 33 GAUGE
EACH MISCELLANEOUS 2 TIMES DAILY
COMMUNITY
Start: 2024-10-27

## 2025-04-15 RX ORDER — INSULIN GLARGINE 100 [IU]/ML
INJECTION, SOLUTION SUBCUTANEOUS
COMMUNITY
Start: 2025-04-07

## 2025-04-15 RX ORDER — BENAZEPRIL HYDROCHLORIDE 40 MG/1
40 TABLET ORAL
COMMUNITY

## 2025-04-15 RX ORDER — PANTOPRAZOLE SODIUM 40 MG/1
40 TABLET, DELAYED RELEASE ORAL EVERY MORNING
COMMUNITY
Start: 2025-03-27

## 2025-04-15 RX ORDER — LOSARTAN POTASSIUM 100 MG/1
100 TABLET ORAL
COMMUNITY
Start: 2025-02-25

## 2025-04-15 RX ORDER — GLUCAGON 3 MG/1
POWDER NASAL
COMMUNITY
Start: 2024-10-24

## 2025-04-15 RX ORDER — GABAPENTIN 800 MG/1
800 TABLET ORAL 4 TIMES DAILY
COMMUNITY
Start: 2025-04-06

## 2025-04-15 RX ORDER — CEPHALEXIN 500 MG/1
500 CAPSULE ORAL EVERY 6 HOURS
COMMUNITY
Start: 2025-01-17 | End: 2025-04-16 | Stop reason: ALTCHOICE

## 2025-04-15 RX ORDER — CLONIDINE 0.2 MG/24H
1 PATCH, EXTENDED RELEASE TRANSDERMAL
COMMUNITY
Start: 2025-04-11

## 2025-04-15 RX ORDER — MINOCYCLINE HYDROCHLORIDE 100 MG/1
100 CAPSULE ORAL EVERY 12 HOURS
COMMUNITY
Start: 2025-01-17

## 2025-04-15 RX ORDER — INSULIN LISPRO 100 [IU]/ML
INJECTION, SOLUTION INTRAVENOUS; SUBCUTANEOUS
COMMUNITY
Start: 2025-04-12

## 2025-04-15 RX ORDER — METFORMIN HYDROCHLORIDE 500 MG/1
500 TABLET ORAL 2 TIMES DAILY
COMMUNITY
Start: 2025-02-25

## 2025-04-15 RX ORDER — MUPIROCIN 20 MG/G
OINTMENT TOPICAL 3 TIMES DAILY
COMMUNITY
Start: 2025-01-16

## 2025-04-15 RX ORDER — CLOTRIMAZOLE AND BETAMETHASONE DIPROPIONATE 10; .64 MG/G; MG/G
CREAM TOPICAL 2 TIMES DAILY
COMMUNITY
Start: 2025-01-06 | End: 2025-04-16 | Stop reason: ALTCHOICE

## 2025-04-15 RX ORDER — ONDANSETRON 8 MG/1
8 TABLET, ORALLY DISINTEGRATING ORAL EVERY 6 HOURS PRN
COMMUNITY
Start: 2025-03-27

## 2025-04-15 RX ORDER — ALBUTEROL SULFATE 90 UG/1
2 INHALANT RESPIRATORY (INHALATION) EVERY 6 HOURS PRN
COMMUNITY
Start: 2025-02-15

## 2025-04-15 RX ORDER — FLASH GLUCOSE SENSOR
KIT MISCELLANEOUS
COMMUNITY
Start: 2025-03-19

## 2025-04-15 RX ORDER — CHLORTHALIDONE 25 MG/1
25 TABLET ORAL
COMMUNITY
Start: 2025-02-25

## 2025-04-15 RX ORDER — CLONIDINE 0.3 MG/24H
1 PATCH, EXTENDED RELEASE TRANSDERMAL
COMMUNITY
Start: 2025-03-17

## 2025-04-15 RX ORDER — IBUPROFEN 800 MG/1
800 TABLET ORAL 3 TIMES DAILY
COMMUNITY
Start: 2025-01-13

## 2025-04-15 RX ORDER — PEN NEEDLE, DIABETIC 32GX 5/32"
NEEDLE, DISPOSABLE MISCELLANEOUS
COMMUNITY
Start: 2024-12-05

## 2025-04-15 RX ORDER — CYCLOBENZAPRINE HCL 10 MG
10 TABLET ORAL 3 TIMES DAILY PRN
COMMUNITY
End: 2025-04-16 | Stop reason: ALTCHOICE

## 2025-04-15 RX ORDER — TRIAMTERENE/HYDROCHLOROTHIAZID 37.5-25 MG
1 TABLET ORAL DAILY
COMMUNITY

## 2025-04-15 NOTE — PROGRESS NOTES
"Patient Name: Luke Crowder Jr.   : 1980  MRN: [id number]     SUBJECTIVE DATA:    CHIEF COMPLAINT:   Luke Crowder Jr. is a 44 y.o. male who presents to clinic today with Type 2 diabetes         HPI:  44-year-old male presents to endocrine clinic accompanied by his father to establish care and to discuss type 2 diabetes.  Past medical history of diabetes type 2, gastroparesis.    Patient state he has been type 2 diabetic since .    Patient currently on Lantus 100 units at bedtime, insulin lispro 90 units subQ twice a day, and metformin 500 mg p.o. b.i.d. "questionable dosing".    Patient utilizing CGM freestyle Jeremias.  Unable to access information patient forgot his password.  Patient declined resetting password in clinic .    Discussed with patient to be able to adjust medications we need to interpret CGM Jeremias results.  Patient requesting insulin pump evaluation because he is unable to control his diabetes.  Will discuss on next visit.  Patient agreed to return back in 8 days.    Patient denies chest pain, shortness of breath, dyspnea on exertion, palpitations, peripheral edema, abdominal pain, nausea, vomiting, diarrhea, constipation, fatigue, fever, chills, dysuria,  hematuria, melena, or hematochezia.        Plan of care:  Hemoglobin A1c 11.6%  Labs today, urine microalbumin, C-peptide, kodi 65, CMP, lipid panel  Continue to follow diabetic diet meal planning.  Continue medications as prescribed by your PCP in regards to diabetes and hypertension and cholesterol.  Patient utilizing CGM freestyle Jeremias.  Unable to access information patient forgot his password.  Patient declined resetting password in clinic .  Discussed with patient to be able to adjust medications we need to interpret CGM Jeremias results.Patient agreed to return back in 8 days.  Continue Lantus 100 units at bedtime, insulin lispro 90 units subQ twice a day, and metformin 500 mg p.o. b.i.d. bring medications with you on next " "visit  Discussed different insulin pumps and answered questions.  Stay physically active and exercise at least 30 minutes a day up to 5 days a week as simple as brisk walking.  Keep salt intake less than 2 g per day.    Keep fiber intake at least 30 g per day.    Stay hydrated with water.    Follow low-fat, low-cholesterol diet.    Lose 10% of your body weight in 6 months approximately 22 lb.    Portion control, lifestyle modifications and healthy eating habits.    Get enough sleep at least 7-8 hours a day.    We will call you with test results when they become available.  Questions solicited and answered, patient verbalized understanding and agreed to plan of care.    Addendum:  C-peptide 3.4 within normal range  Urine microalbumin 41.1, urine creatinine 124.8, microalbumin to creatinine ratio 32.9 stay hydrated with water and list get diabetes under control.  CMP:  Sodium 137, potassium 4, glucose 321, BUN 10.6, creatinine 0.86, calcium 9.3, albumin 4.0, liver enzymes within normal range, eGFR> 60            ALLERGIES:   Review of patient's allergies indicates:   Allergen Reactions    Egg derived     Influenza virus vaccines      Other Reaction(s): Influenza vaccine allergy         ROS:  Review of Systems   All other systems reviewed and are negative.        OBJECTIVE DATA:  Vital signs  Vitals:    04/15/25 1147 04/15/25 1237   BP: (!) 198/130 (!) 189/126   BP Location: Right arm    Patient Position: Sitting    Pulse: 87    Resp: 18    Temp: 98 °F (36.7 °C)    TempSrc: Oral    Weight: 107.2 kg (236 lb 5.3 oz)    Height: 5' 7" (1.702 m)       Body mass index is 37.02 kg/m².    PHYSICAL EXAM:   Physical Exam  Vitals and nursing note reviewed.   Constitutional:       General: He is awake. He is not in acute distress.     Appearance: Normal appearance. He is well-developed and well-groomed. He is obese. He is not ill-appearing, toxic-appearing or diaphoretic.   HENT:      Head: Normocephalic and atraumatic.      Right " Ear: External ear normal.      Left Ear: External ear normal.      Nose: Nose normal.      Mouth/Throat:      Lips: Pink.      Mouth: Mucous membranes are moist.      Pharynx: Oropharynx is clear.   Eyes:      General: Gaze aligned appropriately.      Extraocular Movements: Extraocular movements intact.      Pupils: Pupils are equal, round, and reactive to light.   Neck:      Thyroid: No thyroid mass, thyromegaly or thyroid tenderness.      Trachea: Trachea normal.   Cardiovascular:      Rate and Rhythm: Normal rate and regular rhythm.      Pulses: Normal pulses.           Radial pulses are 2+ on the right side and 2+ on the left side.        Dorsalis pedis pulses are 2+ on the right side and 2+ on the left side.        Posterior tibial pulses are 2+ on the left side.      Heart sounds: Normal heart sounds. No murmur heard.  Pulmonary:      Effort: Pulmonary effort is normal.      Breath sounds: Normal breath sounds and air entry. No wheezing.   Abdominal:      Palpations: Abdomen is soft.   Genitourinary:     Comments: Patient denies any urinary symptoms or bowel habit changes  Musculoskeletal:         General: Normal range of motion.      Cervical back: Normal range of motion and neck supple. No rigidity or tenderness.      Right foot: Normal range of motion. No deformity, bunion, Charcot foot, foot drop or prominent metatarsal heads.      Left foot: Normal range of motion. No deformity, bunion, Charcot foot, foot drop or prominent metatarsal heads.   Feet:      Right foot:      Protective Sensation: 10 sites tested.  10 sites sensed.      Skin integrity: Skin integrity normal.      Toenail Condition: Right toenails are normal.      Left foot:      Protective Sensation: 10 sites tested.        Skin integrity: Skin integrity normal.      Toenail Condition: Left toenails are normal.      Comments: Patient wearing slippers.  Advise patient to wear protective footwear such as tennis and closed toe shoes.  Patient  state unable to wear tennis shoes it can make him fall and lose balance.  Inspect feet daily.  Keep feet moisturized, do not apply moisture between toe webs.  Continue with good foot hygiene.  Lymphadenopathy:      Cervical: No cervical adenopathy.   Skin:     General: Skin is warm.      Capillary Refill: Capillary refill takes less than 2 seconds.   Neurological:      General: No focal deficit present.      Mental Status: He is alert and oriented to person, place, and time. Mental status is at baseline.      GCS: GCS eye subscore is 4. GCS verbal subscore is 5. GCS motor subscore is 6.      Cranial Nerves: No cranial nerve deficit.      Sensory: No sensory deficit.      Motor: No weakness.      Coordination: Coordination normal.      Gait: Gait normal.   Psychiatric:         Attention and Perception: Attention normal.         Mood and Affect: Mood normal.         Behavior: Behavior normal. Behavior is cooperative.         Thought Content: Thought content normal.         Cognition and Memory: Cognition normal.         Judgment: Judgment normal.          ASSESSMENT/PLAN:  1. Type 2 diabetes mellitus with hyperglycemia, with long-term current use of insulin  Assessment & Plan:  Plan of care:  Hemoglobin A1c 11.6%  Labs today, urine microalbumin, C-peptide, kodi 65, CMP, lipid panel  Continue to follow diabetic diet meal planning.  Continue medications as prescribed by your PCP in regards to diabetes and hypertension and cholesterol.  Patient utilizing CGM Colizer Jeremias.  Unable to access information patient forgot his password.  Patient declined resetting password in clinic .  Discussed with patient to be able to adjust medications we need to interpret CGM Jeremias results.Patient agreed to return back in 8 days.  Continue Lantus 100 units at bedtime, insulin lispro 90 units subQ twice a day, and metformin 500 mg p.o. b.i.d. bring medications with you on next visit  Stay physically active and exercise at least 30  minutes a day up to 5 days a week as simple as brisk walking.  Keep salt intake less than 2 g per day.    Keep fiber intake at least 30 g per day.    Stay hydrated with water.    Follow low-fat, low-cholesterol diet.    Lose 10% of your body weight in 6 months approximately 22 lb.    Portion control, lifestyle modifications and healthy eating habits.    Get enough sleep at least 7-8 hours a day.    We will call you with test results when they become available.  Questions solicited and answered, patient verbalized understanding and agreed to plan of care.    Addendum:  C-peptide 3.4 within normal range  Urine microalbumin 41.1, urine creatinine 124.8, microalbumin to creatinine ratio 32.9 stay hydrated with water and list get diabetes under control.  CMP:  Sodium 137, potassium 4, glucose 321, BUN 10.6, creatinine 0.86, calcium 9.3, albumin 4.0, liver enzymes within normal range, eGFR> 60    Orders:  -     C-Peptide; Future; Expected date: 04/15/2025  -     Glutamic Acid Decarboxylase; Future; Expected date: 04/15/2025  -     Comprehensive Metabolic Panel; Future; Expected date: 04/15/2025  -     Microalbumin/Creatinine Ratio, Urine  -     Lipid Panel; Future; Expected date: 04/15/2025  -     Foot Exam Performed    2. Uncontrolled type 2 diabetes mellitus with hyperglycemia  -     Ambulatory referral/consult to Endocrinology  -     Hemoglobin A1C, POCT           RESULTS:  Recent Results (from the past 6 weeks)   Microalbumin/Creatinine Ratio, Urine    Collection Time: 04/15/25 12:11 PM   Result Value Ref Range    Urine Microalbumin 41.1 (H) <=30.0 ug/mL    Urine Creatinine 124.8 63.0 - 166.0 mg/dL    Microalbumin Creatinine Ratio 32.9 (H) 0.0 - 30.0 mg/gm Cr   Hemoglobin A1C, POCT    Collection Time: 04/15/25 12:11 PM   Result Value Ref Range    Hemoglobin A1C, POC 11.6 (A) %   C-Peptide    Collection Time: 04/15/25 12:57 PM   Result Value Ref Range    C-Peptide, S 3.4 1.1 - 4.4 ng/mL   Comprehensive Metabolic Panel     Collection Time: 04/15/25 12:57 PM   Result Value Ref Range    Sodium 137 136 - 145 mmol/L    Potassium 4.0 3.5 - 5.1 mmol/L    Chloride 103 98 - 107 mmol/L    CO2 25 22 - 29 mmol/L    Glucose 321 (H) 74 - 100 mg/dL    Blood Urea Nitrogen 10.6 8.9 - 20.6 mg/dL    Creatinine 0.86 0.72 - 1.25 mg/dL    Calcium 9.3 8.4 - 10.2 mg/dL    Protein Total 7.7 6.4 - 8.3 gm/dL    Albumin 4.0 3.5 - 5.0 g/dL    Globulin 3.7 (H) 2.4 - 3.5 gm/dL    Albumin/Globulin Ratio 1.1 1.1 - 2.0 ratio    Bilirubin Total 0.7 <=1.5 mg/dL     40 - 150 unit/L    ALT 34 0 - 55 unit/L    AST 22 11 - 45 unit/L    eGFR >60 mL/min/1.73/m2    Anion Gap 9.0 mEq/L    BUN/Creatinine Ratio 12    Lipid Panel    Collection Time: 04/15/25 12:57 PM   Result Value Ref Range    Cholesterol Total 209 (H) <=200 mg/dL    HDL Cholesterol 41 35 - 60 mg/dL    Triglyceride 159 (H) 34 - 140 mg/dL    Cholesterol/HDL Ratio 5 0 - 5    Very Low Density Lipoprotein 32     LDL Cholesterol 136.00 50.00 - 140.00 mg/dL         Follow Up:  Follow up in about 8 days (around 4/23/2025).     45 minutes of total time spent on the encounter, which includes face to face time and non-face to face time preparing to see the patient (eg, review of tests), Obtaining and/or reviewing separately obtained history, Documenting clinical information in the electronic or other health record, Independently interpreting results (not separately reported) and communicating results to the patient/family/caregiver, or Care coordination (not separately reported).      Previous medical history/lab work/radiology reviewed and considered during medical management decisions.   Medication list reviewed and medication reconciliation performed.  Patient was provided  and care about his/her current diagnosis (es) and medications including risk/benefit and side effects/adverse events, over the counter medication uses/doses, home self-care and contact precautions,  and red flags and indications for  when to seek immediate medical attention.   Patient was advised to continue compliance with current medication list and medical recommendations.  Patient dvised continued compliance with recommended eating habits/ diets for medical conditions and exercise 150 minutes/ week (if possible) for medical condition (s).  Educational handouts and instructions on selected disease management in AVS (After Visit Summary).    All of the patient's questions were answered to patient's satisfaction.   The patient was receptive, expressed verbal understanding and agreement the above plan.        This note was created with the assistance of a voice recognition software or phone dictation. There may be transcription errors as a result of using this technology however minimal. Effort has been made to assure accuracy of transcription but any obvious errors or omissions should be clarified with the author of the document

## 2025-04-16 ENCOUNTER — RESULTS FOLLOW-UP (OUTPATIENT)
Dept: ENDOCRINOLOGY | Facility: CLINIC | Age: 45
End: 2025-04-16

## 2025-04-16 LAB — C PEPTIDE P FAST SERPL-MCNC: 3.4 NG/ML (ref 1.1–4.4)

## 2025-04-16 NOTE — PROGRESS NOTES
Please notify patient regarding lab results  Electrolytes, kidney functions, liver functions within acceptable range.    Urine microalbumin and urine to creatinine ratio elevated and that is due to uncontrolled diabetes, stay hydrated with water and take medications as prescribed.  Please advise patient he needs to bring his medications with him on his next clinic visit.  Cholesterol slightly elevated at 209,   Keep fiber intake at 30 g per day, stay hydrated with water and exercise as well lose weight.  Follow-up with PCP for management  C-peptide within normal range, type 2 diabetic  Thank you and have a happy Easter

## 2025-04-18 LAB — GAD65 AB SER-SCNC: 0 NMOL/L

## 2025-05-05 ENCOUNTER — PATIENT OUTREACH (OUTPATIENT)
Dept: ENDOCRINOLOGY | Facility: CLINIC | Age: 45
End: 2025-05-05
Payer: MEDICAID

## 2025-05-05 LAB
LEFT EYE DM RETINOPATHY: NEGATIVE
RIGHT EYE DM RETINOPATHY: NEGATIVE

## 2025-06-02 ENCOUNTER — OFFICE VISIT (OUTPATIENT)
Dept: ENDOCRINOLOGY | Facility: CLINIC | Age: 45
End: 2025-06-02
Payer: MEDICAID

## 2025-06-02 VITALS
BODY MASS INDEX: 37.08 KG/M2 | SYSTOLIC BLOOD PRESSURE: 180 MMHG | TEMPERATURE: 98 F | HEART RATE: 102 BPM | HEIGHT: 67 IN | WEIGHT: 236.25 LBS | DIASTOLIC BLOOD PRESSURE: 128 MMHG | RESPIRATION RATE: 18 BRPM

## 2025-06-02 DIAGNOSIS — Z79.4 TYPE 2 DIABETES MELLITUS WITH HYPERGLYCEMIA, WITH LONG-TERM CURRENT USE OF INSULIN: Primary | ICD-10-CM

## 2025-06-02 DIAGNOSIS — E11.65 TYPE 2 DIABETES MELLITUS WITH HYPERGLYCEMIA, WITH LONG-TERM CURRENT USE OF INSULIN: Primary | ICD-10-CM

## 2025-06-02 PROCEDURE — 1159F MED LIST DOCD IN RCRD: CPT | Mod: CPTII,,, | Performed by: NURSE PRACTITIONER

## 2025-06-02 PROCEDURE — 3077F SYST BP >= 140 MM HG: CPT | Mod: CPTII,,, | Performed by: NURSE PRACTITIONER

## 2025-06-02 PROCEDURE — 1160F RVW MEDS BY RX/DR IN RCRD: CPT | Mod: CPTII,,, | Performed by: NURSE PRACTITIONER

## 2025-06-02 PROCEDURE — 4010F ACE/ARB THERAPY RXD/TAKEN: CPT | Mod: CPTII,,, | Performed by: NURSE PRACTITIONER

## 2025-06-02 PROCEDURE — 99215 OFFICE O/P EST HI 40 MIN: CPT | Mod: S$PBB,,, | Performed by: NURSE PRACTITIONER

## 2025-06-02 PROCEDURE — 95251 CONT GLUC MNTR ANALYSIS I&R: CPT | Mod: ,,, | Performed by: NURSE PRACTITIONER

## 2025-06-02 PROCEDURE — 3008F BODY MASS INDEX DOCD: CPT | Mod: CPTII,,, | Performed by: NURSE PRACTITIONER

## 2025-06-02 PROCEDURE — 3046F HEMOGLOBIN A1C LEVEL >9.0%: CPT | Mod: CPTII,,, | Performed by: NURSE PRACTITIONER

## 2025-06-02 PROCEDURE — 3080F DIAST BP >= 90 MM HG: CPT | Mod: CPTII,,, | Performed by: NURSE PRACTITIONER

## 2025-06-02 PROCEDURE — 99214 OFFICE O/P EST MOD 30 MIN: CPT | Mod: PBBFAC | Performed by: NURSE PRACTITIONER

## 2025-06-02 RX ORDER — METHOCARBAMOL 750 MG/1
1500 TABLET, FILM COATED ORAL 2 TIMES DAILY
COMMUNITY
Start: 2025-05-08

## 2025-06-02 RX ORDER — BLOOD-GLUCOSE SENSOR
EACH MISCELLANEOUS
Qty: 3 EACH | Refills: 11 | Status: SHIPPED | OUTPATIENT
Start: 2025-06-02

## 2025-06-02 RX ORDER — BLOOD-GLUCOSE,RECEIVER,CONT
EACH MISCELLANEOUS
Qty: 1 EACH | Refills: 0 | Status: SHIPPED | OUTPATIENT
Start: 2025-06-02

## 2025-08-06 ENCOUNTER — CLINICAL SUPPORT (OUTPATIENT)
Facility: CLINIC | Age: 45
End: 2025-08-06
Payer: MEDICAID

## 2025-08-06 ENCOUNTER — TELEPHONE (OUTPATIENT)
Facility: CLINIC | Age: 45
End: 2025-08-06

## 2025-08-06 VITALS — HEIGHT: 67 IN | WEIGHT: 253 LBS | BODY MASS INDEX: 39.71 KG/M2

## 2025-08-06 DIAGNOSIS — E11.65 TYPE 2 DIABETES MELLITUS WITH HYPERGLYCEMIA, WITH LONG-TERM CURRENT USE OF INSULIN: Primary | ICD-10-CM

## 2025-08-06 DIAGNOSIS — Z79.4 TYPE 2 DIABETES MELLITUS WITH HYPERGLYCEMIA, WITH LONG-TERM CURRENT USE OF INSULIN: Primary | ICD-10-CM

## 2025-08-06 PROCEDURE — G0108 DIAB MANAGE TRN  PER INDIV: HCPCS | Mod: S$GLB,,, | Performed by: FAMILY MEDICINE

## 2025-08-06 NOTE — PROGRESS NOTES
"Diabetes Care Specialist Progress Note  Author: Kristen Segura RN  Date: 8/6/2025    Intake    Program Intake  Reason for Diabetes Program Visit:: Initial Diabetes Assessment  Current diabetes risk level:: high  In the last month, have you used the ER or been admitted to the hospital: No  Permission to speak with others about care:: yes    Current Diabetes Treatment: Oral Medications, Insulin  Oral Medication Type/Dose: metformin 500mg bid  Method of insulin delivery?: Injections  Injection Type: Pens  Pen Type/Dose: Lantus 55units qam, lispro 20units plus >180 1:50 correction    Continuous Glucose Monitoring  Patient has CGM: Yes  Personal CGM type:: Dexcom G7  GMI Date: 08/06/25  GMI Value: 9.1 %    No results found for: "LABA1C", "HGBA1C"    Weight: 114.8 kg (253 lb)   Height: 5' 7" (170.2 cm)   Body mass index is 39.63 kg/m².    Lifestyle Coping Support & Clinical    Lifestyle/Coping/Support  Compared to other people your age, how would you rate your health?: Poor  Does anyone in your family have diabetes or does anyone in your family support you in your diabetes care?: Lives with father and good support  List anything about Diabetes that causes you stress?: Not really just elevated glucose  How do you deal with stress/distress?: Support from loved ones  Learning Barriers:: None  Culture or Mosque beliefs that may impact ability to access healthcare: No  Psychosocial/Coping Skills Assessment Completed: : Yes  Assessment indicates:: Adequate understanding  Area of need?: No    Problem Review  Active Comorbidities: Obesity, Other (comment) (Gastroparesis)    Diabetes Self-Management Skills Assessment    Medication Skills Assessment  Patient is able to identify current diabetes medications, dosages, and appropriate timing of medications.: yes  Patient reports problems or concerns with current medication regimen.: yes  Medication regimen problems/concerns:: does not feel like regimen is working  Patient is  " aware that some diabetes medications can cause low blood sugar?: Yes  Medication Skills Assessment Completed:: Yes  Assessment indicates:: Adequate understanding  Area of need?: No    Diabetes Disease Process/Treatment Options  Diabetes Type?: Type II  When were you diagnosed?: 2020  If previous diabetes education, when/where:: yes [facility]  What are your goals for this education session?: education to prepare for pump  Is patient aware of what causes diabetes?: Yes  Does patient understand the pathophysiology of diabetes?: Yes  Diabetes Disease Process/Treatment Options: Skills Assessment Completed: Yes  Assessment indicates:: Adequate understanding  Area of need?: No    Nutrition/Healthy Eating  Meal Plan 24 Hour Recall - Breakfast: skips  Meal Plan 24 Hour Recall - Lunch: skips  Meal Plan 24 Hour Recall - Dinner: air fried chicken wings cucumbers, plain hamburger blanca and chips  Meal Plan 24 Hour Recall - Snack: yogurt dannon lite and fit, with fruit,  Meal Plan 24 Hour Recall - Beverage: flavored water  Who shops/cooks?: father cooks and pt. shops  Patient can identify foods that impact blood sugar.: yes  Challenges to healthy eating:: other (see comments) (gastro paresis)  Nutrition/Healthy Eating Skills Assessment Completed:: Yes  Assessment indicates:: Adequate understanding  Area of need?: No    Physical Activity/Exercise  Patient's daily activity level:: sedentary  Patient formally exercises outside of work.: no  Reasons for not exercising:: physically unable to exercise currently  Patient can identify forms of physical activity.: yes  Physical Activity/Exercise Skills Assessment Completed: : Yes  Assessment indicates:: Adequate understanding  Area of need?: Yes    Home Blood Glucose Monitoring  Patient states that blood sugar is checked at home daily.: yes  Monitoring Method:: personal continuous glucose monitor  Personal CGM type:: Dexcom G7   What is your current Time in Range?: 16%  What is your  A1c Target?: <8  Home Blood Glucose Monitoring Skills Assessment Completed: : Yes  Assessment indicates:: Knowledge deficit, Instruction Needed  Area of need?: Yes    Acute Complications  Have you ever had hypoglycemia (low BG 70 or less)?: yes  How often and what are your symptoms?: once a month starts sweating  How do you treat hypoglycemia?: glucogon  Have you ever had hyperglycemia (high  or more)?: yes  How often and what are your symptoms?: 2-3 times a week  How do you treat hyperglycemia? : water, meds  Have you ever had DKA?: no  Do you ever test for ketones?: no  Do you have a sick day plan?: no  Acute Complications Skills Assessment Completed: : Yes  Assessment indicates:: Adequate understanding  Area of need?: No    Chronic Complications  Chronic Complications Skills Assessment Completed: : No  Deferred due to:: Time  Area of need?: Deferred      Assessment Summary and Plan    Based on today's diabetes care assessment, the following areas of need were identified:      Identified Areas of Need      Medication/Current Diabetes Treatment: Yes see care plan   Lifestyle Coping/Support: No lives with father and good support   Diabetes Disease Process/Treatment Options: No   Nutrition/Healthy Eating:  yes see care plan   Physical Activity/Exercise: Yes see care plan   Home Blood Glucose Monitoring: Yes reviewed AGP with patient today time in range 16% very high 52% high 32% low 0% and very low less than 1% he is entering dosages of insulin most of the time discussed need for 100% of the time 5 days prior to endo appointment to determine settings for insulin pump see link below for full report   Acute Complications: No    Chronic Complications: Deferred       Today's interventions were provided through individual discussion, instruction, and written materials were provided.      Patient verbalized understanding of instruction and written materials.  Pt was able to return back demonstration of instructions  today. Patient understood key points, needs reinforcement and further instruction.     Diabetes Self-Management Care Plan:    Today's Diabetes Self-Management Care Plan was developed with Luke's input. Luke has agreed to work toward the following goal(s) to improve his/her overall diabetes control.      Care Plan: Diabetes Management   Updates made since 8/6/2024 12:00 AM        Problem: Physical Activity and Exercise         Long-Range Goal: Patient agrees to add arm puffy weights 5 min daily    Start Date: 8/6/2025   Priority: Low   Barriers: Physical Limitations   Note:    8/6/25  has some physical limitations but states he can do foot pedal and some hand putty weight exercises as well short periods of time       Task: Discussed role of physical activity on reducing insulin resistance and improvement in overall glycemic control. Completed 8/6/2025        Task: Discussed role of physical activity as it relates to weight loss Completed 8/6/2025        Task: Offered suggestions on how patient could increase their regular physical activity Completed 8/6/2025        Task: Reviewed blood glucose monitoring before, during and after exercise/activity Completed 8/6/2025        Problem: Healthy Eating         Long-Range Goal: Patient agrees to practice accurate carb counting in preparation for insulin pump    Start Date: 8/6/2025   Priority: High   Barriers: Knowledge deficit   Note:    8/6/25 patient states severe gastroparesis usually can only eat in the evening and very small amounts. Gave options carb counting booklet.        Task: Explained how to count carbohydrates using the food label and the use of dry measuring cups for accurate carb counting. Completed 8/6/2025     Task: Recommended replacing beverages containing high sugar content with noncaloric/sugar free options and/or water. Completed 8/6/2025        Task: Review the importance of balancing carbohydrates with each meal using portion control techniques to  count servings of carbohydrate and label reading to identify serving size and amount of total carbs per serving. Completed 8/6/2025        Task: Provided Sample plate method and reviewed the use of the plate to estimate amounts of carbohydrate per meal. Completed 8/6/2025        Problem: Medications         Long-Range Goal: Patient agrees to practice using Tandem t-slim simulator to prepare for possible pump start    Start Date: 8/6/2025   Priority: Medium   Barriers: Knowledge deficit   Note:    8/6/25 Patient is interested in insulin pump therapy.   Insulin Pump Evaluation Check List:      Carbohydrate Counting Skills Evaluation:   Reviewed basic Carbohydrate Counting principles--Food groups, label reading, use of dry measuring cups for accurate portion sizes.   Provided online and aldo-based resources to help with counting carbohydrates.     Introduction to Insulin Pump Therapy:  Reviewed basics of insulin pump therapy   Introduced and discussed common terms such as: ISF/CF, CHO ratio, Goal BG, Bolus, Basal Rate, Active Insulin Time, and Insulin on Board.     Discussed basic problem-solving skills for responding to hypo and/or hyperglycemia with regard to insulin pump therapy.     Insulin Pump Options:   Reviewed major insulin pump types:   Tandem: T-slim X 2, Mobi  iLet pump  Omni pod 5    Discussed unique features of each pump:   max volume of U-100 rapid acting insulin held in each ranges from 160-300 units  how often each pod, cartridge or reservoir should be changed  integration of CGM with each pump (which CGM integrates with each pump) in order to have automated insulin delivery  Patient was able to practice using pump simulators  Discussed the integration of CGM into the pumps to provide automatic adjustments based on the CGM data           Task: Reviewed with patient all current diabetes medications and provided basic review of the purpose, dosage, frequency, side effects, and storage of both oral and  injectable diabetes medications. Completed 8/6/2025        Task: Discussed guidelines for preventing, detecting and treating hypoglycemia and hyperglycemia and reviewed the importance of meal and medication timing with diabetes mediations for prevention of hypoglycemia and maximum drug benefit. Completed 8/6/2025        File Link    Document on 8/6/2025  3:34 PM by Kristen Segura RN: [id number]      Follow Up Plan     Follow up in about 4 weeks (around 9/3/2025) for Review food log, agp  and carb counting, practice filling insulin pump cartridges.  Appointment made with patient today for August 20th at 1:00 p.m. [place] office    Today's care plan and follow up schedule was discussed with patient.  Luke verbalized understanding of the care plan, goals, and agrees to follow up plan.        The patient was encouraged to communicate with his/her health care provider/physician and care team regarding his/her condition(s) and treatment.  I provided the patient with my contact information today and encouraged to contact me via phone or [facility]'s Patient Portal as needed.     Length of Visit   Total Time: 120 Minutes

## 2025-08-11 ENCOUNTER — OFFICE VISIT (OUTPATIENT)
Dept: ENDOCRINOLOGY | Facility: CLINIC | Age: 45
End: 2025-08-11
Payer: MEDICAID

## 2025-08-11 VITALS
HEIGHT: 67 IN | DIASTOLIC BLOOD PRESSURE: 91 MMHG | RESPIRATION RATE: 18 BRPM | BODY MASS INDEX: 39.94 KG/M2 | HEART RATE: 108 BPM | TEMPERATURE: 99 F | WEIGHT: 254.5 LBS | SYSTOLIC BLOOD PRESSURE: 182 MMHG

## 2025-08-11 DIAGNOSIS — E11.65 TYPE 2 DIABETES MELLITUS WITH HYPERGLYCEMIA, WITH LONG-TERM CURRENT USE OF INSULIN: Primary | ICD-10-CM

## 2025-08-11 DIAGNOSIS — Z79.4 TYPE 2 DIABETES MELLITUS WITH HYPERGLYCEMIA, WITH LONG-TERM CURRENT USE OF INSULIN: Primary | ICD-10-CM

## 2025-08-11 PROCEDURE — 99214 OFFICE O/P EST MOD 30 MIN: CPT | Mod: S$PBB,,, | Performed by: NURSE PRACTITIONER

## 2025-08-11 PROCEDURE — 3080F DIAST BP >= 90 MM HG: CPT | Mod: CPTII,,, | Performed by: NURSE PRACTITIONER

## 2025-08-11 PROCEDURE — 95251 CONT GLUC MNTR ANALYSIS I&R: CPT | Mod: ,,, | Performed by: NURSE PRACTITIONER

## 2025-08-11 PROCEDURE — 1160F RVW MEDS BY RX/DR IN RCRD: CPT | Mod: CPTII,,, | Performed by: NURSE PRACTITIONER

## 2025-08-11 PROCEDURE — 3060F POS MICROALBUMINURIA REV: CPT | Mod: CPTII,,, | Performed by: NURSE PRACTITIONER

## 2025-08-11 PROCEDURE — 83036 HEMOGLOBIN GLYCOSYLATED A1C: CPT | Mod: PBBFAC | Performed by: NURSE PRACTITIONER

## 2025-08-11 PROCEDURE — 3008F BODY MASS INDEX DOCD: CPT | Mod: CPTII,,, | Performed by: NURSE PRACTITIONER

## 2025-08-11 PROCEDURE — 3066F NEPHROPATHY DOC TX: CPT | Mod: CPTII,,, | Performed by: NURSE PRACTITIONER

## 2025-08-11 PROCEDURE — 4010F ACE/ARB THERAPY RXD/TAKEN: CPT | Mod: CPTII,,, | Performed by: NURSE PRACTITIONER

## 2025-08-11 PROCEDURE — 3077F SYST BP >= 140 MM HG: CPT | Mod: CPTII,,, | Performed by: NURSE PRACTITIONER

## 2025-08-11 PROCEDURE — 99215 OFFICE O/P EST HI 40 MIN: CPT | Mod: PBBFAC | Performed by: NURSE PRACTITIONER

## 2025-08-11 PROCEDURE — 3046F HEMOGLOBIN A1C LEVEL >9.0%: CPT | Mod: CPTII,,, | Performed by: NURSE PRACTITIONER

## 2025-08-11 PROCEDURE — 1159F MED LIST DOCD IN RCRD: CPT | Mod: CPTII,,, | Performed by: NURSE PRACTITIONER

## 2025-08-11 RX ORDER — GLUCAGON 3 MG/1
1 POWDER NASAL
Qty: 2 EACH | Refills: 3 | Status: SHIPPED | OUTPATIENT
Start: 2025-08-11

## 2025-08-12 LAB — HBA1C MFR BLD: 9.2 %

## 2025-08-19 ENCOUNTER — LAB VISIT (OUTPATIENT)
Dept: LAB | Facility: HOSPITAL | Age: 45
End: 2025-08-19
Attending: NURSE PRACTITIONER
Payer: MEDICAID

## 2025-08-19 ENCOUNTER — TELEPHONE (OUTPATIENT)
Dept: ENDOCRINOLOGY | Facility: CLINIC | Age: 45
End: 2025-08-19
Payer: MEDICAID

## 2025-08-19 DIAGNOSIS — E11.65 TYPE 2 DIABETES MELLITUS WITH HYPERGLYCEMIA, WITH LONG-TERM CURRENT USE OF INSULIN: Primary | ICD-10-CM

## 2025-08-19 DIAGNOSIS — Z79.4 TYPE 2 DIABETES MELLITUS WITH HYPERGLYCEMIA, WITH LONG-TERM CURRENT USE OF INSULIN: ICD-10-CM

## 2025-08-19 DIAGNOSIS — E11.65 TYPE 2 DIABETES MELLITUS WITH HYPERGLYCEMIA, WITH LONG-TERM CURRENT USE OF INSULIN: ICD-10-CM

## 2025-08-19 DIAGNOSIS — Z79.4 TYPE 2 DIABETES MELLITUS WITH HYPERGLYCEMIA, WITH LONG-TERM CURRENT USE OF INSULIN: Primary | ICD-10-CM

## 2025-08-19 DIAGNOSIS — Z79.4 ENCOUNTER FOR LONG-TERM (CURRENT) USE OF INSULIN: ICD-10-CM

## 2025-08-19 DIAGNOSIS — E11.65 INADEQUATELY CONTROLLED DIABETES MELLITUS: Primary | ICD-10-CM

## 2025-08-19 LAB
ANION GAP SERPL CALC-SCNC: 9 MEQ/L
BUN SERPL-MCNC: 9 MG/DL (ref 8.9–20.6)
CALCIUM SERPL-MCNC: 9 MG/DL (ref 8.4–10.2)
CHLORIDE SERPL-SCNC: 109 MMOL/L (ref 98–107)
CO2 SERPL-SCNC: 26 MMOL/L (ref 22–29)
CREAT SERPL-MCNC: 0.69 MG/DL (ref 0.72–1.25)
CREAT/UREA NIT SERPL: 13
GFR SERPLBLD CREATININE-BSD FMLA CKD-EPI: >60 ML/MIN/1.73/M2
GLUCOSE SERPL-MCNC: 81 MG/DL (ref 74–100)
POTASSIUM SERPL-SCNC: 3.7 MMOL/L (ref 3.5–5.1)
SODIUM SERPL-SCNC: 144 MMOL/L (ref 136–145)

## 2025-08-19 PROCEDURE — 80048 BASIC METABOLIC PNL TOTAL CA: CPT

## 2025-08-19 PROCEDURE — 36415 COLL VENOUS BLD VENIPUNCTURE: CPT

## 2025-08-19 PROCEDURE — 84681 ASSAY OF C-PEPTIDE: CPT

## 2025-08-19 PROCEDURE — 86341 ISLET CELL ANTIBODY: CPT

## 2025-08-21 LAB — C PEPTIDE P FAST SERPL-MCNC: 0.5 NG/ML (ref 1.1–4.4)
